# Patient Record
Sex: FEMALE | Race: OTHER | Employment: UNEMPLOYED | URBAN - METROPOLITAN AREA
[De-identification: names, ages, dates, MRNs, and addresses within clinical notes are randomized per-mention and may not be internally consistent; named-entity substitution may affect disease eponyms.]

---

## 2020-03-11 ENCOUNTER — HOSPITAL ENCOUNTER (OUTPATIENT)
Dept: MEDSURG UNIT | Facility: HOSPITAL | Age: 60
End: 2020-03-12
Attending: INTERNAL MEDICINE | Admitting: INTERNAL MEDICINE

## 2020-03-11 LAB
ABS NEUT (OLG): 3.78 X10(3)/MCL (ref 2.1–9.2)
ALBUMIN SERPL-MCNC: 3.6 GM/DL (ref 3.4–5)
ALBUMIN/GLOB SERPL: 0.8 RATIO (ref 1.1–2)
ALP SERPL-CCNC: 101 UNIT/L (ref 45–117)
ALT SERPL-CCNC: 31 UNIT/L (ref 12–78)
APPEARANCE, UA: CLEAR
APTT PPP: 29.7 SECOND(S) (ref 23.3–37)
AST SERPL-CCNC: 22 UNIT/L (ref 15–37)
BACTERIA #/AREA URNS AUTO: ABNORMAL /HPF
BASOPHILS # BLD AUTO: 0 X10(3)/MCL (ref 0–0.2)
BASOPHILS NFR BLD AUTO: 0 %
BILIRUB SERPL-MCNC: 0.3 MG/DL (ref 0.2–1)
BILIRUB UR QL STRIP: NEGATIVE
BILIRUBIN DIRECT+TOT PNL SERPL-MCNC: 0.1 MG/DL (ref 0–0.2)
BILIRUBIN DIRECT+TOT PNL SERPL-MCNC: 0.2 MG/DL
BUN SERPL-MCNC: 16 MG/DL (ref 7–18)
CALCIUM SERPL-MCNC: 9.2 MG/DL (ref 8.5–10.1)
CHLORIDE SERPL-SCNC: 111 MMOL/L (ref 98–107)
CO2 SERPL-SCNC: 26 MMOL/L (ref 21–32)
COLOR UR: ABNORMAL
CREAT SERPL-MCNC: 0.9 MG/DL (ref 0.6–1.3)
D DIMER PPP IA.FEU-MCNC: 1.56 MCG/ML FEU
EOSINOPHIL # BLD AUTO: 0.1 X10(3)/MCL (ref 0–0.9)
EOSINOPHIL NFR BLD AUTO: 1 %
ERYTHROCYTE [DISTWIDTH] IN BLOOD BY AUTOMATED COUNT: 14 % (ref 11.5–14.5)
EST. AVERAGE GLUCOSE BLD GHB EST-MCNC: 117 MG/DL
GLOBULIN SER-MCNC: 4.3 GM/ML (ref 2.3–3.5)
GLUCOSE (UA): NEGATIVE
GLUCOSE SERPL-MCNC: 88 MG/DL (ref 74–106)
HBA1C MFR BLD: 5.7 % (ref 4.2–6.3)
HCT VFR BLD AUTO: 42 % (ref 35–46)
HGB BLD-MCNC: 13.8 GM/DL (ref 12–16)
HGB UR QL STRIP: NEGATIVE
HYALINE CASTS #/AREA URNS LPF: ABNORMAL /LPF
IMM GRANULOCYTES # BLD AUTO: 0.01 10*3/UL
IMM GRANULOCYTES NFR BLD AUTO: 0 %
INR PPP: 0.91 (ref 0.9–1.2)
KETONES UR QL STRIP: NEGATIVE
LEUKOCYTE ESTERASE UR QL STRIP: NEGATIVE
LIPASE SERPL-CCNC: 172 UNIT/L (ref 73–393)
LYMPHOCYTES # BLD AUTO: 1.5 X10(3)/MCL (ref 0.6–4.6)
LYMPHOCYTES NFR BLD AUTO: 25 %
MAGNESIUM SERPL-MCNC: 2.3 MG/DL (ref 1.6–2.6)
MAGNESIUM SERPL-MCNC: 2.4 MG/DL (ref 1.6–2.6)
MCH RBC QN AUTO: 29.4 PG (ref 26–34)
MCHC RBC AUTO-ENTMCNC: 32.9 GM/DL (ref 31–37)
MCV RBC AUTO: 89.6 FL (ref 80–100)
MONOCYTES # BLD AUTO: 0.4 X10(3)/MCL (ref 0.1–1.3)
MONOCYTES NFR BLD AUTO: 8 %
NEUTROPHILS # BLD AUTO: 3.78 X10(3)/MCL (ref 2.1–9.2)
NEUTROPHILS NFR BLD AUTO: 65 %
NITRITE UR QL STRIP: NEGATIVE
PH UR STRIP: 5.5 [PH] (ref 4.5–8)
PLATELET # BLD AUTO: 158 X10(3)/MCL (ref 130–400)
PMV BLD AUTO: 11.3 FL (ref 7.4–10.4)
POTASSIUM SERPL-SCNC: 4.4 MMOL/L (ref 3.5–5.1)
PROT SERPL-MCNC: 7.9 GM/DL (ref 6.4–8.2)
PROT UR QL STRIP: NEGATIVE
PROTHROMBIN TIME: 12.2 SECOND(S) (ref 11.9–14.4)
RBC # BLD AUTO: 4.69 X10(6)/MCL (ref 4–5.2)
RBC #/AREA URNS AUTO: ABNORMAL /HPF
SODIUM SERPL-SCNC: 141 MMOL/L (ref 136–145)
SP GR UR STRIP: 1.04 (ref 1–1.03)
SQUAMOUS #/AREA URNS LPF: ABNORMAL /LPF
T4 FREE SERPL-MCNC: 1.11 NG/DL (ref 0.76–1.46)
TROPONIN I SERPL-MCNC: <0.015 NG/ML (ref 0–0.05)
TROPONIN I SERPL-MCNC: <0.015 NG/ML (ref 0–0.05)
TSH SERPL-ACNC: 0.81 MIU/L (ref 0.36–3.74)
UROBILINOGEN UR STRIP-ACNC: NORMAL
WBC # SPEC AUTO: 5.8 X10(3)/MCL (ref 4.5–11)
WBC #/AREA URNS AUTO: ABNORMAL /HPF

## 2020-03-12 LAB
ABS NEUT (OLG): 3.19 X10(3)/MCL (ref 2.1–9.2)
ALBUMIN SERPL-MCNC: 3.1 GM/DL (ref 3.4–5)
ALBUMIN/GLOB SERPL: 0.8 RATIO (ref 1.1–2)
ALP SERPL-CCNC: 90 UNIT/L (ref 45–117)
ALT SERPL-CCNC: 30 UNIT/L (ref 12–78)
AST SERPL-CCNC: 24 UNIT/L (ref 15–37)
BASOPHILS # BLD AUTO: 0 X10(3)/MCL (ref 0–0.2)
BASOPHILS NFR BLD AUTO: 1 %
BILIRUB SERPL-MCNC: 0.3 MG/DL (ref 0.2–1)
BILIRUBIN DIRECT+TOT PNL SERPL-MCNC: 0.1 MG/DL (ref 0–0.2)
BILIRUBIN DIRECT+TOT PNL SERPL-MCNC: 0.2 MG/DL
BUN SERPL-MCNC: 24 MG/DL (ref 7–18)
CALCIUM SERPL-MCNC: 8.7 MG/DL (ref 8.5–10.1)
CHLORIDE SERPL-SCNC: 114 MMOL/L (ref 98–107)
CHOLEST SERPL-MCNC: 198 MG/DL
CHOLEST/HDLC SERPL: 2.2 {RATIO} (ref 0–4.4)
CO2 SERPL-SCNC: 24 MMOL/L (ref 21–32)
CREAT SERPL-MCNC: 0.9 MG/DL (ref 0.6–1.3)
EOSINOPHIL # BLD AUTO: 0.1 X10(3)/MCL (ref 0–0.9)
EOSINOPHIL NFR BLD AUTO: 2 %
ERYTHROCYTE [DISTWIDTH] IN BLOOD BY AUTOMATED COUNT: 13.8 % (ref 11.5–14.5)
GLOBULIN SER-MCNC: 4 GM/ML (ref 2.3–3.5)
GLUCOSE SERPL-MCNC: 87 MG/DL (ref 74–106)
HCT VFR BLD AUTO: 40.7 % (ref 35–46)
HDLC SERPL-MCNC: 88 MG/DL (ref 40–59)
HGB BLD-MCNC: 13.3 GM/DL (ref 12–16)
IMM GRANULOCYTES # BLD AUTO: 0.01 10*3/UL
IMM GRANULOCYTES NFR BLD AUTO: 0 %
LDLC SERPL CALC-MCNC: 93 MG/DL
LYMPHOCYTES # BLD AUTO: 1.5 X10(3)/MCL (ref 0.6–4.6)
LYMPHOCYTES NFR BLD AUTO: 28 %
MAGNESIUM SERPL-MCNC: 2.2 MG/DL (ref 1.6–2.6)
MCH RBC QN AUTO: 29.2 PG (ref 26–34)
MCHC RBC AUTO-ENTMCNC: 32.7 GM/DL (ref 31–37)
MCV RBC AUTO: 89.5 FL (ref 80–100)
MONOCYTES # BLD AUTO: 0.5 X10(3)/MCL (ref 0.1–1.3)
MONOCYTES NFR BLD AUTO: 9 %
NEUTROPHILS # BLD AUTO: 3.19 X10(3)/MCL (ref 2.1–9.2)
NEUTROPHILS NFR BLD AUTO: 60 %
PHOSPHATE SERPL-MCNC: 3.4 MG/DL (ref 2.5–4.9)
PLATELET # BLD AUTO: 146 X10(3)/MCL (ref 130–400)
PMV BLD AUTO: 11.3 FL (ref 7.4–10.4)
POTASSIUM SERPL-SCNC: 4 MMOL/L (ref 3.5–5.1)
PROT SERPL-MCNC: 7.1 GM/DL (ref 6.4–8.2)
RBC # BLD AUTO: 4.55 X10(6)/MCL (ref 4–5.2)
SODIUM SERPL-SCNC: 144 MMOL/L (ref 136–145)
TRIGL SERPL-MCNC: 86 MG/DL
VLDLC SERPL CALC-MCNC: 17 MG/DL
WBC # SPEC AUTO: 5.3 X10(3)/MCL (ref 4.5–11)

## 2020-04-07 ENCOUNTER — HISTORICAL (OUTPATIENT)
Dept: RADIOLOGY | Facility: HOSPITAL | Age: 60
End: 2020-04-07

## 2020-05-25 ENCOUNTER — HISTORICAL (OUTPATIENT)
Dept: CARDIOLOGY | Facility: HOSPITAL | Age: 60
End: 2020-05-25

## 2020-05-25 LAB
ABS NEUT (OLG): 3.15 X10(3)/MCL (ref 2.1–9.2)
APTT PPP: 30 SECOND(S) (ref 23.3–37)
BASOPHILS # BLD AUTO: 0 X10(3)/MCL (ref 0–0.2)
BASOPHILS NFR BLD AUTO: 0 %
BUN SERPL-MCNC: 15 MG/DL (ref 7–18)
CALCIUM SERPL-MCNC: 9.7 MG/DL (ref 8.5–10.1)
CHLORIDE SERPL-SCNC: 106 MMOL/L (ref 98–107)
CO2 SERPL-SCNC: 30 MMOL/L (ref 21–32)
CREAT SERPL-MCNC: 0.9 MG/DL (ref 0.6–1.3)
CREAT/UREA NIT SERPL: 17
EOSINOPHIL # BLD AUTO: 0.1 X10(3)/MCL (ref 0–0.9)
EOSINOPHIL NFR BLD AUTO: 1 %
ERYTHROCYTE [DISTWIDTH] IN BLOOD BY AUTOMATED COUNT: 14.4 % (ref 11.5–14.5)
GLUCOSE SERPL-MCNC: 83 MG/DL (ref 74–106)
HCT VFR BLD AUTO: 44 % (ref 35–46)
HGB BLD-MCNC: 14.4 GM/DL (ref 12–16)
IMM GRANULOCYTES # BLD AUTO: 0.01 10*3/UL
IMM GRANULOCYTES NFR BLD AUTO: 0 %
INR PPP: 0.98 (ref 0.9–1.2)
LYMPHOCYTES # BLD AUTO: 1.6 X10(3)/MCL (ref 0.6–4.6)
LYMPHOCYTES NFR BLD AUTO: 30 %
MCH RBC QN AUTO: 29.5 PG (ref 26–34)
MCHC RBC AUTO-ENTMCNC: 32.7 GM/DL (ref 31–37)
MCV RBC AUTO: 90.2 FL (ref 80–100)
MONOCYTES # BLD AUTO: 0.6 X10(3)/MCL (ref 0.1–1.3)
MONOCYTES NFR BLD AUTO: 10 %
NEUTROPHILS # BLD AUTO: 3.15 X10(3)/MCL (ref 2.1–9.2)
NEUTROPHILS NFR BLD AUTO: 58 %
PLATELET # BLD AUTO: 135 X10(3)/MCL (ref 130–400)
PMV BLD AUTO: 11.9 FL (ref 7.4–10.4)
POTASSIUM SERPL-SCNC: 4.1 MMOL/L (ref 3.5–5.1)
PROTHROMBIN TIME: 12.6 SECOND(S) (ref 11.9–14.4)
RBC # BLD AUTO: 4.88 X10(6)/MCL (ref 4–5.2)
SODIUM SERPL-SCNC: 140 MMOL/L (ref 136–145)
WBC # SPEC AUTO: 5.4 X10(3)/MCL (ref 4.5–11)

## 2020-05-27 ENCOUNTER — HISTORICAL (OUTPATIENT)
Dept: CARDIOLOGY | Facility: HOSPITAL | Age: 60
End: 2020-05-27

## 2020-06-08 ENCOUNTER — HISTORICAL (OUTPATIENT)
Dept: CARDIOLOGY | Facility: HOSPITAL | Age: 60
End: 2020-06-08

## 2020-11-02 ENCOUNTER — HISTORICAL (OUTPATIENT)
Dept: CARDIOLOGY | Facility: CLINIC | Age: 60
End: 2020-11-02

## 2020-11-02 LAB
ALBUMIN SERPL-MCNC: 4.2 GM/DL (ref 3.5–5)
ALBUMIN/GLOB SERPL: 1 RATIO (ref 1.1–2)
ALP SERPL-CCNC: 100 UNIT/L (ref 40–150)
ALT SERPL-CCNC: 17 UNIT/L (ref 0–55)
AST SERPL-CCNC: 22 UNIT/L (ref 5–34)
BILIRUB SERPL-MCNC: 0.7 MG/DL
BILIRUBIN DIRECT+TOT PNL SERPL-MCNC: 0.3 MG/DL (ref 0–0.5)
BILIRUBIN DIRECT+TOT PNL SERPL-MCNC: 0.4 MG/DL (ref 0–0.8)
BUN SERPL-MCNC: 16 MG/DL (ref 9.8–20.1)
CALCIUM SERPL-MCNC: 9.7 MG/DL (ref 8.4–10.2)
CHLORIDE SERPL-SCNC: 107 MMOL/L (ref 98–107)
CHOLEST SERPL-MCNC: 242 MG/DL
CHOLEST/HDLC SERPL: 3 {RATIO} (ref 0–5)
CO2 SERPL-SCNC: 26 MMOL/L (ref 22–29)
CREAT SERPL-MCNC: 0.86 MG/DL (ref 0.55–1.02)
GLOBULIN SER-MCNC: 4.2 GM/DL (ref 2.4–3.5)
GLUCOSE SERPL-MCNC: 81 MG/DL (ref 74–100)
HDLC SERPL-MCNC: 93 MG/DL (ref 35–60)
LDLC SERPL CALC-MCNC: 135 MG/DL (ref 50–140)
POTASSIUM SERPL-SCNC: 4.2 MMOL/L (ref 3.5–5.1)
PROT SERPL-MCNC: 8.4 GM/DL (ref 6.4–8.3)
SODIUM SERPL-SCNC: 143 MMOL/L (ref 136–145)
TRIGL SERPL-MCNC: 68 MG/DL (ref 37–140)
VLDLC SERPL CALC-MCNC: 14 MG/DL

## 2021-01-29 ENCOUNTER — HISTORICAL (OUTPATIENT)
Dept: CARDIOLOGY | Facility: CLINIC | Age: 61
End: 2021-01-29

## 2021-01-29 LAB
ALBUMIN SERPL-MCNC: 4.4 GM/DL (ref 3.5–5)
ALBUMIN/GLOB SERPL: 1 RATIO (ref 1.1–2)
ALP SERPL-CCNC: 97 UNIT/L (ref 40–150)
ALT SERPL-CCNC: 22 UNIT/L (ref 0–55)
AST SERPL-CCNC: 29 UNIT/L (ref 5–34)
BILIRUB SERPL-MCNC: 0.4 MG/DL
BILIRUBIN DIRECT+TOT PNL SERPL-MCNC: 0.1 MG/DL (ref 0–0.5)
BILIRUBIN DIRECT+TOT PNL SERPL-MCNC: 0.3 MG/DL (ref 0–0.8)
BUN SERPL-MCNC: 17 MG/DL (ref 9.8–20.1)
CALCIUM SERPL-MCNC: 9.8 MG/DL (ref 8.4–10.2)
CHLORIDE SERPL-SCNC: 107 MMOL/L (ref 98–107)
CHOLEST SERPL-MCNC: 245 MG/DL
CHOLEST/HDLC SERPL: 3 {RATIO} (ref 0–5)
CO2 SERPL-SCNC: 24 MMOL/L (ref 22–29)
CREAT SERPL-MCNC: 0.87 MG/DL (ref 0.55–1.02)
GLOBULIN SER-MCNC: 4.3 GM/DL (ref 2.4–3.5)
GLUCOSE SERPL-MCNC: 75 MG/DL (ref 74–100)
HDLC SERPL-MCNC: 91 MG/DL (ref 35–60)
LDLC SERPL CALC-MCNC: 138 MG/DL (ref 50–140)
POTASSIUM SERPL-SCNC: 4.9 MMOL/L (ref 3.5–5.1)
PROT SERPL-MCNC: 8.7 GM/DL (ref 6.4–8.3)
SODIUM SERPL-SCNC: 143 MMOL/L (ref 136–145)
TRIGL SERPL-MCNC: 81 MG/DL (ref 37–140)
VLDLC SERPL CALC-MCNC: 16 MG/DL

## 2021-03-29 ENCOUNTER — HISTORICAL (OUTPATIENT)
Dept: RADIOLOGY | Facility: HOSPITAL | Age: 61
End: 2021-03-29

## 2021-09-09 ENCOUNTER — HISTORICAL (OUTPATIENT)
Dept: CARDIOLOGY | Facility: CLINIC | Age: 61
End: 2021-09-09

## 2021-09-09 LAB
ALBUMIN SERPL-MCNC: 4.2 GM/DL (ref 3.5–5)
ALBUMIN/GLOB SERPL: 1 RATIO (ref 1.1–2)
ALP SERPL-CCNC: 91 UNIT/L (ref 40–150)
ALT SERPL-CCNC: 13 UNIT/L (ref 0–55)
AST SERPL-CCNC: 19 UNIT/L (ref 5–34)
BILIRUB SERPL-MCNC: 0.6 MG/DL
BILIRUBIN DIRECT+TOT PNL SERPL-MCNC: 0.2 MG/DL (ref 0–0.5)
BILIRUBIN DIRECT+TOT PNL SERPL-MCNC: 0.4 MG/DL (ref 0–0.8)
BUN SERPL-MCNC: 14.7 MG/DL (ref 9.8–20.1)
CALCIUM SERPL-MCNC: 10.2 MG/DL (ref 8.4–10.2)
CHLORIDE SERPL-SCNC: 108 MMOL/L (ref 98–107)
CHOLEST SERPL-MCNC: 244 MG/DL
CHOLEST/HDLC SERPL: 3 {RATIO} (ref 0–5)
CO2 SERPL-SCNC: 27 MMOL/L (ref 22–29)
CREAT SERPL-MCNC: 0.83 MG/DL (ref 0.55–1.02)
GLOBULIN SER-MCNC: 4 GM/DL (ref 2.4–3.5)
GLUCOSE SERPL-MCNC: 92 MG/DL (ref 74–100)
HDLC SERPL-MCNC: 82 MG/DL (ref 35–60)
LDLC SERPL CALC-MCNC: 148 MG/DL (ref 50–140)
POTASSIUM SERPL-SCNC: 4.4 MMOL/L (ref 3.5–5.1)
PROT SERPL-MCNC: 8.2 GM/DL (ref 6.4–8.3)
SODIUM SERPL-SCNC: 143 MMOL/L (ref 136–145)
TRIGL SERPL-MCNC: 72 MG/DL (ref 37–140)
VLDLC SERPL CALC-MCNC: 14 MG/DL

## 2022-04-05 LAB
HIGH RISK HPV 16 (PRECISION): NEGATIVE
HIGH RISK HPV 18/45 (PRECISION): NEGATIVE
PAP RECOMMENDATION EXT: NORMAL
PAP SMEAR: NORMAL

## 2022-04-10 ENCOUNTER — HISTORICAL (OUTPATIENT)
Dept: ADMINISTRATIVE | Facility: HOSPITAL | Age: 62
End: 2022-04-10

## 2022-04-14 ENCOUNTER — HISTORICAL (OUTPATIENT)
Dept: RADIOLOGY | Facility: HOSPITAL | Age: 62
End: 2022-04-14

## 2022-04-14 ENCOUNTER — HISTORICAL (OUTPATIENT)
Dept: ADMINISTRATIVE | Facility: HOSPITAL | Age: 62
End: 2022-04-14

## 2022-04-27 VITALS
BODY MASS INDEX: 27.57 KG/M2 | DIASTOLIC BLOOD PRESSURE: 71 MMHG | SYSTOLIC BLOOD PRESSURE: 110 MMHG | OXYGEN SATURATION: 100 % | WEIGHT: 175.69 LBS | HEIGHT: 67 IN

## 2022-04-30 NOTE — ED PROVIDER NOTES
Patient:   Leonora Higginbotham             MRN: [id number]            FIN: [id number]               Age:   60 years     Sex:  Female     :  1960   Associated Diagnoses:   Chest pain; Shortness of breath   Author:   Roshan Sexton MD      Basic Information   Additional information: Chief Complaint from Nursing Triage Note : Chief Complaint   3/11/2020 8:20 CDT       Chief Complaint           PT W CO CP/PAL X 10 DAYS.  EKG OBTAINED.  .      History of Present Illness   Patient presents with 10 days of intermittent chest pain feeling she is having palpitations and shortness of breath.  Her chest discomfort and shortness of breath is worse with exertion.  She states that she did have heart surgery where they did a catheterization as well as having to take fluid off of her heart in the past.  No recent cough congestion fevers nausea vomiting      Review of Systems   Constitutional:  No fever, fatigue or weight loss.    Skin:  No rash.    ENT:  No congestion, ear pain, or sore throat.    Eyes:  No recent vision problems or eye pain.    Endocrine:  No thyroid problems.    Cardiovascular:   chest pain.    Respiratory: Shortness of breath  Gastrointestinal:  No abdominal pain, nausea, vomiting, or diarrhea.    Genitourinary: No dysuria.    Musculoskeletal:  No joint swelling.    Neurologic:  No seizures.  Hematologic:  No unusual bruising or bleeding.    Psychiatric:  No psychiatric problems, hallucinations or depression.  All other systems reviewed and otherwise negative.         Health Status   Allergies:    Allergic Reactions (All)  No Known Allergies,    Allergies (1) Active Reaction  No Known Allergies None Documented  .      Past Medical/ Family/ Social History   Medical history:    Resolved  Migraines ([id number]):  Resolved..   Surgical history:    heart surgery to repair hole in  at 52 Years..   Family history:    Asthma.  Mother  Hypertension.  Father  Mother  .    Social history:    Social & Psychosocial Habits    Alcohol  08/10/2014 Risk Assessment: Denies Alcohol Use    09/10/2015  Use: Never    Employment/School  06/08/2015 Risk Assessment: Not employed or in school    Home/Environment  09/09/2015  Lives with: Spouse    Living situation: Home/Independent    Substance Use  08/10/2014 Risk Assessment: Denies Substance Abuse    09/10/2015  Use: Never    Tobacco  08/10/2014 Risk Assessment: Denies Tobacco Use    09/10/2015  Use: Never smoker    12/30/2018  Use: Never smoker    Patient Wants Consult For Cessation Counseling N/A    03/11/2020  Use: Never (less than 100 in l    Patient Wants Consult For Cessation Counseling N/A    Abuse/Neglect  03/11/2020  SHX Any signs of abuse or neglect No  .   Problem list:    Active Problems (1)  Migraines   .      Physical Examination               Vital Signs   Vital Signs   3/11/2020 8:39 CDT       Peripheral Pulse Rate     67 bpm                             Heart Rate Monitored      66 bpm                             Respiratory Rate          21 br/min                             SpO2                      98 %                             Oxygen Therapy            Room air                             Systolic Blood Pressure   124 mmHg                             Diastolic Blood Pressure  79 mmHg                             Mean Arterial Pressure, Cuff              94 mmHg  (Modified)   3/11/2020 8:20 CDT       Temperature Oral          36.4 DegC                             Temperature Oral (calculated)             97.52 DegF                             Peripheral Pulse Rate     70 bpm                             Respiratory Rate          18 br/min                             SpO2                      99 %                             Oxygen Therapy            Room air                             Systolic Blood Pressure   132 mmHg                             Diastolic Blood Pressure  74 mmHg  .      Vital Signs (last 24 hrs)_____  Last  Charted___________  Temp Oral     36.4 DegC  (MAR 11 08:20)  Heart Rate Peripheral   67 bpm  (MAR 11 08:39)  Resp Rate         21 br/min  (MAR 11 08:39)  SBP      124 mmHg  (MAR 11 08:39)  DBP      79 mmHg  (MAR 11 08:39)  SpO2      98 %  (MAR 11 08:39)  Height      170 cm  (MAR 11 08:20)  .   Measurements   3/11/2020 8:20 CDT       Weight Dosing             78 kg                             Weight Measured and Calculated in Lbs     171.96 lb                             Weight Estimated          78 kg                             Height/Length Dosing      170 cm                             Height/Length Measured    170 cm  .   Basic Oxygen Information   3/11/2020 8:39 CDT       SpO2                      98 %                             Oxygen Therapy            Room air    3/11/2020 8:20 CDT       SpO2                      99 %                             Oxygen Therapy            Room air  .   VITAL SIGNS:  Reviewed.      GENERAL:  In no apparent distress.    HEAD:  No signs of head trauma.  EYES:  Pupils are equal.  Extraocular motions intact.    EARS:  Hearing grossly intact.  MOUTH:  Oropharynx is normal.   NECK:  No adenopathy, no JVD.     CHEST:  Chest with clear breath sounds bilaterally.  No wheezes, rales, or rhonchi.    CARDIAC:  Regular rate and rhythm.  S1 and S2, without murmurs, gallops, or rubs.  ABDOMEN:  Soft, without detectable tenderness.  No sign of distention.  No   rebound or guarding, and no masses palpated.   Bowel Sounds normal.  MUSCULOSKELETAL:  Good range of motion of all major joints. Extremities without clubbing, cyanosis or edema.    NEUROLOGIC EXAM:  Alert and oriented x 3.  No focal sensory or strength deficits.   Speech normal.  Follows commands.  PSYCHIATRIC:  Mood normal.  SKIN: Midline old incision surgical scar on chest         Medical Decision Making   Electrocardiogram:  Time 8:23 AM  Rate of 75, normal sinus rhythm, normal axis and intervals.   Results review:     No  qualifying data available.   HEART Score 4, workup in progress.  Approximately 10 days of intermittent chest discomfort shortness of breath worse with exertion with cardiac history    Negative CT of chest that was obtained due to positive d-dimer.  Due to patient's cardiac history and heart score and intermittent shortness of breath and chest pain with exertion will be admitted for further evaluation.  Internal medicine team to accept      Reexamination/ Reevaluation   Vital signs   Basic Oxygen Information   3/11/2020 8:39 CDT       SpO2                      98 %                             Oxygen Therapy            Room air    3/11/2020 8:20 CDT       SpO2                      99 %                             Oxygen Therapy            Room air        Impression and Plan   Diagnosis   Chest pain (PAR50-OA R07.9)   Shortness of breath (OUV66-FM R06.02)   Plan   Disposition: Admit time  3/11/2020 12:56:00, IM Team.    Counseled: Patient.

## 2022-05-03 NOTE — HISTORICAL [facility] CERNER
This is a historical note converted from Amanda. Formatting and pictures may have been removed.  Please reference [facility] for original formatting and attached multimedia. Admit and Discharge Dates  Admit Date: 03/11/2020  Discharge Date: 03/12/2020  Physicians  Attending Physician - Mo LUEVANO, Kate  Admitting Physician - Mo LUEVANO, Kate  Primary Care Physician - No PCP, No  Discharge Diagnosis  1.?Chest pain?R07.9  ?  2.?Palpitations?[id number]  ?  3.?Shortness of breath?R06.02  ?  Surgical Procedures  No procedures recorded for this visit.  Immunizations  No immunizations recorded for this visit.  Admission Information  Ms Aneudy Barnes?is a 60-year-old  female with past medical history of migraine?presents to the ED with complaints of chest pain, shortness of breath and palpitations.??History was taken with the help of an .? Patient states that her chest pain?started 1 day back,?localized to the central part of her chest, nonradiating,?intermittent, not related to exertion?or rest, 2/10 in intensity.? She states that her main complaint is of palpitations?which she has had for a while now, describes it as her heart beating really fast.??She states that these episodes of tachycardia are accompanied by shortness of breath.? She also states that she has episodes of dizziness?which is sometimes associated with syncope.? She has baseline exertion,?unable to climb a flight of stairs as per the patient?she gets acutely short of breath.? She stated that she had surgery for heart around 7 years back, I had a hole in my heart which was fixed. ?He stated that?following the surgery she had some bleeding to her heart?following which?a tube was placed.? She stated that?all her symptoms started after her heart surgery.? Denies any blurring of vision,?headache,?nausea/vomiting, abdominal pain, bowel/bladder abnormalities.? She states that?her legs occasionally swell  up.In the ED,vitals were stable.? CBC, CMP, troponin were WNL.? D-dimer was elevated at 1.56 following which CT Angio chest was done which did not?reveal any PE but showed mild bibasilar atelectasis or scarring.? Internal medicine was?consulted for atypical chest pain?and palpitations.  Hospital Course  Patient did not have any complaints overnight. ?She stated that her chest pain had improved but still complained of some shortness of breath and palpitations.? Troponin and EKG continued to remain unremarkable.? Post wards follow-up visit in internal medicine clinic?to establish PCP. ?Also set up outpatient?Lexiscan and?Holter monitoring.? We will follow-up with results. ?Advised patient to take 1 baby aspirin?daily?on discharge.? Reconciled all her medications.  Significant Findings  CXR on 3/11  This compared to a previous study from 9/10/2015  ?  There are atelectatic changes in the lung bases. Heart size is within  normal limits for this technique. Patient has had a previous CABG. No  acute infiltrates are seen.  ?  IMPRESSION: Changes most consistent with bibasilar atelectasis.  Time Spent on discharge  30 mins  Objective  Vitals & Measurements  T:?36.5? ?C (Oral)? TMIN:?36.5? ?C (Oral)? TMAX:?36.9? ?C (Oral)? HR:?58(Peripheral)? HR:?57(Monitored)? RR:?18? BP:?111/63? SpO2:?99%?  Physical Exam  General: AAOx3, NAD, alert and cooperative  HEENT: Normocephalic, atraumatic,?PERRLA, EOMI,?no scleral icterus, no?conjunctival pallor  Neck: no LAD, no JVD, supple, no masses or thyromegaly. Trachea midline  CVS: S1/S2 nml, RRR, no murmurs, rubs or gallops  Resp: CTA B/L, no rhonchi, rales, or wheezing  GI: Not distended, not tender, BS+, no guarding  Skin: not jaundiced, warm, no rashes  Extremities: no peripheral edema, peripheral pulses intact  Neuro: CN II-XII grossly intact, strength and sensation symmetric and intact throughout, no focal neurological deficits.  Patient Discharge Condition  Stable to be discharged  home  Discharge Disposition  Discharge to home  Report to Emergency Department if symptoms return or worsen  Follow up in IM post wards clinic on 4/6/2020 with Dr. Zak Shaikh to establish PCP   Discharge Medication Reconciliation  Prescribed  aspirin (aspirin 81 mg oral tablet, CHEWABLE)?81 mg, Oral, Daily  Continue  SUMAtriptan (Imitrex 50 mg oral tablet)?50 mg, Oral, Daily, PRN for migraine headache  baclofen (baclofen 10 mg oral tablet)?10 mg, Oral, TID  diclofenac (diclofenac sodium 75 mg oral delayed release tablet)?75 mg, Oral, BID  fluticasone nasal (fluticasone 50 mcg/inh nasal spray)?2 spray(s), Nasal, BID  Education and Orders Provided  Palpitations, Easy-to-Read(Burmese)  Nonspecific Chest Pain, Easy-to-Read(Burmese)  Syncope, Easy-to-Read(Burmese)  Dizziness, Easy-to-Read(Burmese)  Discharge - 03/12/20 13:22:00 CDT, Home?  Follow up  Report to Emergency Department if symptoms return or worsen  Follow up in IM post wards clinic on 4/6/2020 with Dr. Zak Shaikh to establish PCP      I was present with the Resident during discharge day management.  ?  [ x?] I discussed the case with the Resident and agree with the discharge plan as above.  [ ?] I discussed the case with the Resident and agree with the discharge plan as above?except:  ?  Time spent on discharge [ >30?] minutes  ?

## 2022-05-03 NOTE — HISTORICAL [facility]
This is a historical note converted from Amanda. Formatting and pictures may have been removed.  Please reference Cerc[facility]la for original formatting and attached multimedia. Chief Complaint  PT W CO CP/PAL X 10 DAYS. ?EKG OBTAINED.  History of Present Illness  Ms Aneudy Barnes?is a 60-year-old  female with past medical history of migraine?presents to the ED with complaints of chest pain, shortness of breath and palpitations.??History was taken with the help of an .? Patient states that her chest pain?started 1 day back,?localized to the central part of her chest, nonradiating,?intermittent, not related to exertion?or rest, 2/10 in intensity.? She states that her main complaint is of palpitations?which she has had for a while now, describes it as her heart beating really fast.??She states that these episodes of tachycardia are accompanied by shortness of breath.? She also states that she has episodes of dizziness?which is sometimes associated with syncope.? She has baseline exertion,?unable to climb a flight of stairs as per the patient?she gets acutely short of breath.? She stated that she had surgery for heart around 7 years back, I had a hole in my heart which was fixed. ?He stated that?following the surgery she had some bleeding to her heart?following which?a tube was placed.? She stated that?all her symptoms started after her heart surgery.? Denies any blurring of vision,?headache,?nausea/vomiting, abdominal pain, bowel/bladder abnormalities.? She states that?her legs occasionally swell up.  ?  Past medical history:?Migraine headaches  Past surgical history:?Heart surgery?to repair?hole  Social history: Denies smoking, alcohol or illicit drug use  Family history: Mother had asthma  ?  In the ED,vitals were stable.? CBC, CMP, troponin were WNL.? D-dimer was elevated at 1.56 following which CT Angio chest was done which did not?reveal any PE but showed mild bibasilar atelectasis or scarring.?  Internal medicine was?consulted for atypical chest pain?and palpitations.  Review of Systems  Constitutional: No appetite changes, weight loss, fever, chills, change in activity?level.  HEENT: No earache, No changes vision, no changes in swallowing, no coryza, no scleral or conjunctival changes, no sore throat.  Respiratory: SOB (+), No cough, wheezing, stridor  Cardiovascular: Chest pain and palpitations (+), ?No discoloration,?history of murmur  Gastrointestinal: No vomiting, stool changes, blood in stool, abdominal pain.  Genitourinary: No frequency, discharge, ?burning, blood in urine.  Neurological: History of migraine headaches?(+),no lethargy, seizure activity,weakness  Developmental: No history of gross motor, fine motor, speech or other developmental delays.  Integumentary: No rashes, bruising, lesions.  Psych/Behavior: No history of irritability, lethargy, behavior changes, mood changes.  Endocrine: No unexpected weight changes, growth pattern changes, skin temperature changes, heat or cold intolerance, appetite or thirst changes.  Musculoskeletal: No swelling of joints, diminished use of extremities, gait changes, weakness.  Physical Exam  Vitals & Measurements  T:?36.7? ?C (Oral)? TMIN:?36.4? ?C (Oral)? TMAX:?36.7? ?C (Oral)? HR:?81(Peripheral)? HR:?80(Monitored)? RR:?17? BP:?131/79? SpO2:?100%? WT:?78?kg? BMI:?26.99?  General:?well-developed well-nourished in no acute distress  Eye: PERRLA, EOMI, clear conjunctiva, eyelids normal  Head:?Normocephalic, atraumatic  ENT:?oropharynx without erythema/exudate, oropharynx and nasal mucosal surfaces moist, no maxillary/frontal sinus tenderness to palpation  Neck: full range of motion, no JVD or carotid bruits. Trachea midline. No thyromegaly or lymphadenopathy  Respiratory:?clear to auscultation bilaterally, without rales, wheezes or rhonchi. Normal work of breathing. Chest rise symmetrical on inspiration.  Cardiovascular:?regular rate and rhythm without  murmurs, Normal peripheral perfusion.  Gastrointestinal:?soft, non-tender, non-distended with normal bowel sounds, without masses to palpation  Genitourinary: no CVA tenderness to palpation  Musculoskeletal:?full range of motion of all extremities/spine without limitation or discomfort  Integumentary: no rashes or skin lesions present  Neurologic: cranial nerves intact, no signs of peripheral neurological deficit, motor/sensory function intact  Cognition and Speech:?Oriented, Speech clear and coherent, Functional cognition intact.  Psychiatric:?Cooperative, Appropriate mood & affect  Assessment/Plan  1) Atypical chest pain  2) Palpitations  - Patient came in with complaints of 1 day of chest pain, palpitations and shortness of breath  - History of heart surgery?7 years back to repair a hole in her heart  - Initial troponin and EKG were unremarkable  - Trending troponin and EKG x 1  - Echo ordered, will follow up with results  - Orthostatic vitals ordered?in view of?history of dizziness and syncope  - Follow-up with HbA1c, lipid panel, TSH/T4  - We will set up for outpatient follow-up with internal medicine clinic and cardiology clinic?to work-up?palpitations with Holter monitor or loop recorder  ?  ?  DVT Px: Lovenox 40mg  GI Px:?None  Diet:?Cardiac meal plan  IVF:?None  Abx:?None  Dispo: admitted to?telemetry with monitor,?pending echo read, HbA1c/lipid panel/free T4, will set up?9 clinic to establish PCP and follow-up appointment with cardiology?for Holter/loop recorder   Problem List/Past Medical History  Ongoing  Migraines  Historical  Migraines  Procedure/Surgical History  heart surgery to repair hole (2012)   Medications  Inpatient  acetaminophen, 1000 mg= 2 tab(s), Oral, q6hr, PRN  labetalol, 10 mg= 2 mL, IV Push, q2hr, PRN  Lovenox 40 mg/0.4 mL subcutaneous solution, 40 mg= 0.4 mL, Subcutaneous, Daily  Toradol, 60 mg= 2 mL, IM, Once  Zofran, 4 mg= 2 mL, IV Push, q4hr, PRN  Home  baclofen 10 mg oral tablet,  10 mg= 1 tab(s), Oral, TID,? ?Not taking  diclofenac sodium 75 mg oral delayed release tablet, 75 mg= 1 tab(s), Oral, BID,? ?Not taking  fluticasone 50 mcg/inh nasal spray, 2 spray(s), Nasal, BID,? ?Not taking  Imitrex 50 mg oral tablet, 50 mg= 1 tab(s), Oral, Daily, PRN  Allergies  No Known Allergies  Social History  Abuse/Neglect  No, 03/11/2020  Alcohol - Denies Alcohol Use, 08/10/2014  Never, 09/10/2015  Employment/School - Not employed or in school, 06/08/2015  Home/Environment  Lives with Spouse. Living situation: Home/Independent., 09/09/2015  Substance Use - Denies Substance Abuse, 08/10/2014  Never, 09/10/2015  Tobacco - Denies Tobacco Use, 08/10/2014  Never (less than 100 in lifetime), N/A, 03/11/2020  Never smoker, N/A, 12/30/2018  Never smoker, 09/10/2015  Family History  Asthma.: Mother.  Hypertension.: Mother and Father.  Lab Results  Labs Last 24 Hours?  ?Chemistry? Hematology/Coagulation?   Sodium Lvl: 141 mmol/L (03/11/20 09:39:00) PT: 12.2 second(s) (03/11/20 09:39:00)   Potassium Lvl: 4.4 mmol/L (03/11/20 09:39:00) INR: 0.91 (03/11/20 09:39:00)   Chloride:?111 mmol/L?High (03/11/20 09:39:00) PTT: 29.7 second(s) (03/11/20 09:39:00)   CO2: 26 mmol/L (03/11/20 09:39:00) D-Dimer:?1.56 mcg/ml FEU?High (03/11/20 09:39:00)   Calcium Lvl: 9.2 mg/dL (03/11/20 09:39:00) WBC: 5.8 x10(3)/mcL (03/11/20 09:39:00)   Magnesium Lvl: 2.3 mg/dL (03/11/20 09:39:00) RBC: 4.69 x10(6)/mcL (03/11/20 09:39:00)   Glucose Lvl: 88 mg/dL (03/11/20 09:39:00) Hgb: 13.8 gm/dL (03/11/20 09:39:00)   BUN: 16 mg/dL (03/11/20 09:39:00) Hct: 42 % (03/11/20 09:39:00)   Creatinine: 0.9 mg/dL (03/11/20 09:39:00) Platelet: 158 x10(3)/mcL (03/11/20 09:39:00)   eGFR-AA:?83 mL/min?Low (03/11/20 09:39:00) MCV: 89.6 fL (03/11/20 09:39:00)   eGFR-SCOTT:?68 mL/min?Low (03/11/20 09:39:00) MCH: 29.4 pg (03/11/20 09:39:00)   Bili Total: 0.3 mg/dL (03/11/20 09:39:00) MCHC: 32.9 gm/dL (03/11/20 09:39:00)   Bili Direct: 0.1 mg/dL (03/11/20 09:39:00)  RDW: 14 % (03/11/20 09:39:00)   Bili Indirect: 0.2 mg/dL (03/11/20 09:39:00) MPV:?11.3 fL?High (03/11/20 09:39:00)   AST: 22 unit/L (03/11/20 09:39:00) Abs Neut: 3.78 x10(3)/mcL (03/11/20 09:39:00)   ALT: 31 unit/L (03/11/20 09:39:00) Neutro Auto: 65 % (03/11/20 09:39:00)   Alk Phos: 101 unit/L (03/11/20 09:39:00) Lymph Auto: 25 % (03/11/20 09:39:00)   Total Protein: 7.9 gm/dL (03/11/20 09:39:00) Mono Auto: 8 % (03/11/20 09:39:00)   Albumin Lvl: 3.6 gm/dL (03/11/20 09:39:00) Eos Auto: 1 % (03/11/20 09:39:00)   Globulin:?4.3 gm/mL?High (03/11/20 09:39:00) Abs Eos: 0.1 x10(3)/mcL (03/11/20 09:39:00)   A/G Ratio:?0.8 ratio?Low (03/11/20 09:39:00) Basophil Auto: 0 % (03/11/20 09:39:00)   NT pro BNP.:?285 pg/mL?High (03/11/20 09:39:00) Abs Neutro: 3.78 x10(3)/mcL (03/11/20 09:39:00)   Lipase Lvl: 172 unit/L (03/11/20 09:39:00) Abs Lymph: 1.5 x10(3)/mcL (03/11/20 09:39:00)   Troponin-I: <0.015 (03/11/20 09:39:00) Abs Mono: 0.4 x10(3)/mcL (03/11/20 09:39:00)   TSH: 0.808 mIU/L (03/11/20 09:39:00) Abs Baso: 0 x10(3)/mcL (03/11/20 09:39:00)    IG%: 0 % (03/11/20 09:39:00)    IG#: 0.01 (03/11/20 09:39:00)   Diagnostic Results  Accession:?[id number]  Order:?CT Angio Chest PE W Contrast  Report Dt/Tm:?03/11/2020 11:40  Report:?  ?  History  Chest pain.  ?  Reference  None available.  ?  Technique  Helical acquisition through the chest with IV contrast targeting the  pulmonary arteries. Multiplanar and 3D MIP reconstructed images were  provided for review.  mGycm. Automatic exposure control,  adjustment of mA/kV or iterative reconstruction technique was used to  reduce radiation.  ?  Findings  There is good opacification of the pulmonary arterial tree. There is  no evidence of pulmonary thromboembolism. No aortic dissection.?  ?  There is no mediastinal, hilar or axillary lymphadenopathy by size  criteria. Heart is mildly enlarged. No pericardial effusion.  ?  No pleural effusion. There is bibasilar atelectasis or  scarring. No  consolidation suspicious for pneumonia.  ?  No significant abnormality of the imaged upper abdomen. No acute  osseous findings. Median sternotomy noted.  ?  Impression  Negative for pulmonary thromboembolic disease. Mild bibasilar  atelectasis or scarring.  ?  ?  ?  ?  ?  Accession:?[id number]  Order:?XR Chest 1 View  Report Dt/Tm:?03/11/2020 09:48  Report:?  ?chest one view portable  ?  Clinical history is chest pain  ?  This compared to a previous study from 9/10/2015  ?  There are atelectatic changes in the lung bases. Heart size is within  normal limits for this technique. Patient has had a previous CABG. No  acute infiltrates are seen.  ?  IMPRESSION: Changes most consistent with bibasilar atelectasis.  ?      PGY III?addendum.  Patient seen and examined with intern; all lab findings, images and chart documentation were reviewed.?  ?  ?  60 y.o  Serbian speaking F who is admitted for atypical chest pain of 1 days duration that was brought upon by exertion and was self limiting, non radiating and preceded by a 10 day history of anxiety related palpitations. She does not have a PCP. Troponin negative x2. EKG with sinus tachycardia. does have a history of a cardiac procedure/surgery done 7 years ago [place] (records unavailable) s currently not followed by cardiology?provider. ?She also does not have a PCP.??Heart score 1 (age). ?We have admitted her?to?set her up with a PCP and to observe her overnight for any cardiac related events-we can likely set her up with a event monitor for the next 30 days to?determine if she is having any Cerner tachyarrhythmias that might be contributing to her palpitations which are likely?related?to underlying?anxiety.? She does not have HTN, she does not smoke, she does not have diabetes, she does not have hx of MI or strokes. ?She denies any signs or symptoms of?heart failure. ?CT PE protocol was done in the ED?and is negative for a PE. ?TSH, lipid  panel, A1c have been ordered for review. ?We will also order an echocardiogram to evaluate for any structural heart disease.   Seen and examined the patient during morning rounds along with resident, reviewed chart, discussed assessment and plan, appropriate care provided. Agree with the note above.?Chest pain resolved, Troponin normal. Because of gender and presentation?with chest pain of no other etiology, plan to get a stress test as outpatient. Also Monitor for arrhythmias at home. CT angio negative for PE

## 2022-05-26 ENCOUNTER — HOSPITAL ENCOUNTER (EMERGENCY)
Facility: HOSPITAL | Age: 62
Discharge: HOME | End: 2022-05-26
Attending: INTERNAL MEDICINE

## 2022-05-26 VITALS
OXYGEN SATURATION: 100 % | SYSTOLIC BLOOD PRESSURE: 105 MMHG | DIASTOLIC BLOOD PRESSURE: 73 MMHG | WEIGHT: 167.56 LBS | RESPIRATION RATE: 20 BRPM | HEIGHT: 66 IN | BODY MASS INDEX: 26.93 KG/M2 | TEMPERATURE: 98 F | HEART RATE: 56 BPM

## 2022-05-26 DIAGNOSIS — R10.9 ABDOMINAL PAIN: ICD-10-CM

## 2022-05-26 DIAGNOSIS — R10.32 LEFT LOWER QUADRANT ABDOMINAL PAIN: Primary | ICD-10-CM

## 2022-05-26 DIAGNOSIS — N83.209 CYST OF OVARY, UNSPECIFIED LATERALITY: ICD-10-CM

## 2022-05-26 LAB
ALBUMIN SERPL-MCNC: 4.1 GM/DL (ref 3.4–4.8)
ALBUMIN/GLOB SERPL: 1 RATIO (ref 1.1–2)
ALP SERPL-CCNC: 92 UNIT/L (ref 40–150)
ALT SERPL-CCNC: 17 UNIT/L (ref 0–55)
AMYLASE SERPL-CCNC: 65 UNIT/L (ref 25–125)
APPEARANCE UR: CLEAR
AST SERPL-CCNC: 21 UNIT/L (ref 5–34)
BACTERIA #/AREA URNS AUTO: NORMAL /HPF
BASOPHILS # BLD AUTO: 0.04 X10(3)/MCL (ref 0–0.2)
BASOPHILS NFR BLD AUTO: 0.5 %
BILIRUB UR QL STRIP.AUTO: NEGATIVE MG/DL
BILIRUBIN DIRECT+TOT PNL SERPL-MCNC: 0.6 MG/DL
BUN SERPL-MCNC: 12.4 MG/DL (ref 9.8–20.1)
CALCIUM SERPL-MCNC: 10.3 MG/DL (ref 8.4–10.2)
CHLORIDE SERPL-SCNC: 105 MMOL/L (ref 98–107)
CO2 SERPL-SCNC: 24 MMOL/L (ref 23–31)
COLOR UR AUTO: COLORLESS
CREAT SERPL-MCNC: 0.82 MG/DL (ref 0.55–1.02)
EOSINOPHIL # BLD AUTO: 0.08 X10(3)/MCL (ref 0–0.9)
EOSINOPHIL NFR BLD AUTO: 1 %
ERYTHROCYTE [DISTWIDTH] IN BLOOD BY AUTOMATED COUNT: 14 % (ref 11.5–17)
GLOBULIN SER-MCNC: 4.3 GM/DL (ref 2.4–3.5)
GLUCOSE SERPL-MCNC: 71 MG/DL (ref 82–115)
GLUCOSE UR QL STRIP.AUTO: NORMAL MG/DL
HCT VFR BLD AUTO: 40.4 % (ref 37–47)
HGB BLD-MCNC: 12.6 GM/DL (ref 12–16)
HYALINE CASTS #/AREA URNS LPF: NORMAL /LPF
IMM GRANULOCYTES # BLD AUTO: 0.03 X10(3)/MCL (ref 0–0.02)
IMM GRANULOCYTES NFR BLD AUTO: 0.4 % (ref 0–0.43)
KETONES UR QL STRIP.AUTO: NEGATIVE MG/DL
LEUKOCYTE ESTERASE UR QL STRIP.AUTO: NEGATIVE UNIT/L
LIPASE SERPL-CCNC: 29 U/L
LYMPHOCYTES # BLD AUTO: 1.88 X10(3)/MCL (ref 0.6–4.6)
LYMPHOCYTES NFR BLD AUTO: 23.8 %
MCH RBC QN AUTO: 28.8 PG (ref 27–31)
MCHC RBC AUTO-ENTMCNC: 31.2 MG/DL (ref 33–36)
MCV RBC AUTO: 92.4 FL (ref 80–94)
MONOCYTES # BLD AUTO: 0.46 X10(3)/MCL (ref 0.1–1.3)
MONOCYTES NFR BLD AUTO: 5.8 %
NEUTROPHILS # BLD AUTO: 5.4 X10(3)/MCL (ref 2.1–9.2)
NEUTROPHILS NFR BLD AUTO: 68.5 %
NITRITE UR QL STRIP.AUTO: NEGATIVE
NRBC BLD AUTO-RTO: 0 %
PH UR STRIP.AUTO: 7.5 [PH]
PLATELET # BLD AUTO: 85 X10(3)/MCL (ref 130–400)
PMV BLD AUTO: 12.8 FL (ref 9.4–12.4)
POTASSIUM SERPL-SCNC: 4.2 MMOL/L (ref 3.5–5.1)
PROT SERPL-MCNC: 8.4 GM/DL (ref 5.8–7.6)
PROT UR QL STRIP.AUTO: NEGATIVE MG/DL
RBC # BLD AUTO: 4.37 X10(6)/MCL (ref 4.2–5.4)
RBC #/AREA URNS AUTO: NORMAL /HPF
RBC UR QL AUTO: NEGATIVE UNIT/L
SODIUM SERPL-SCNC: 141 MMOL/L (ref 136–145)
SP GR UR STRIP.AUTO: 1.01
SQUAMOUS #/AREA URNS LPF: NORMAL /HPF
TROPONIN I SERPL-MCNC: <0.01 NG/ML (ref 0–0.04)
UROBILINOGEN UR STRIP-ACNC: NORMAL MG/DL
WBC # SPEC AUTO: 7.9 X10(3)/MCL (ref 4.5–11.5)
WBC #/AREA URNS AUTO: NORMAL /HPF

## 2022-05-26 PROCEDURE — 96360 HYDRATION IV INFUSION INIT: CPT

## 2022-05-26 PROCEDURE — 96372 THER/PROPH/DIAG INJ SC/IM: CPT | Mod: 59 | Performed by: PHYSICIAN ASSISTANT

## 2022-05-26 PROCEDURE — 81001 URINALYSIS AUTO W/SCOPE: CPT | Performed by: PHYSICIAN ASSISTANT

## 2022-05-26 PROCEDURE — 99285 EMERGENCY DEPT VISIT HI MDM: CPT | Mod: 25

## 2022-05-26 PROCEDURE — 84484 ASSAY OF TROPONIN QUANT: CPT | Performed by: PHYSICIAN ASSISTANT

## 2022-05-26 PROCEDURE — 85025 COMPLETE CBC W/AUTO DIFF WBC: CPT | Performed by: PHYSICIAN ASSISTANT

## 2022-05-26 PROCEDURE — 93005 ELECTROCARDIOGRAM TRACING: CPT

## 2022-05-26 PROCEDURE — 36415 COLL VENOUS BLD VENIPUNCTURE: CPT | Performed by: PHYSICIAN ASSISTANT

## 2022-05-26 PROCEDURE — 82150 ASSAY OF AMYLASE: CPT | Performed by: PHYSICIAN ASSISTANT

## 2022-05-26 PROCEDURE — 83690 ASSAY OF LIPASE: CPT | Performed by: PHYSICIAN ASSISTANT

## 2022-05-26 PROCEDURE — 25000003 PHARM REV CODE 250: Performed by: PHYSICIAN ASSISTANT

## 2022-05-26 PROCEDURE — 96361 HYDRATE IV INFUSION ADD-ON: CPT

## 2022-05-26 PROCEDURE — 63600175 PHARM REV CODE 636 W HCPCS: Performed by: PHYSICIAN ASSISTANT

## 2022-05-26 PROCEDURE — 80053 COMPREHEN METABOLIC PANEL: CPT | Performed by: PHYSICIAN ASSISTANT

## 2022-05-26 RX ORDER — PRAVASTATIN SODIUM 10 MG/1
10 TABLET ORAL DAILY
COMMUNITY
Start: 2021-12-02 | End: 2022-08-12

## 2022-05-26 RX ORDER — NITROGLYCERIN 0.4 MG/1
0.4 TABLET SUBLINGUAL EVERY 5 MIN PRN
COMMUNITY
Start: 2021-12-02

## 2022-05-26 RX ORDER — ATENOLOL 25 MG/1
25 TABLET ORAL DAILY
COMMUNITY
Start: 2021-12-02 | End: 2023-08-09 | Stop reason: SDUPTHER

## 2022-05-26 RX ORDER — ATORVASTATIN CALCIUM 20 MG/1
20 TABLET, FILM COATED ORAL DAILY
COMMUNITY
Start: 2022-02-17 | End: 2022-08-12 | Stop reason: SDUPTHER

## 2022-05-26 RX ORDER — PANTOPRAZOLE SODIUM 40 MG/1
40 TABLET, DELAYED RELEASE ORAL DAILY
COMMUNITY
Start: 2021-12-02 | End: 2023-02-07 | Stop reason: SDUPTHER

## 2022-05-26 RX ORDER — DICYCLOMINE HYDROCHLORIDE 10 MG/ML
20 INJECTION INTRAMUSCULAR
Status: COMPLETED | OUTPATIENT
Start: 2022-05-26 | End: 2022-05-26

## 2022-05-26 RX ORDER — CYCLOBENZAPRINE HCL 10 MG
10 TABLET ORAL NIGHTLY
COMMUNITY
Start: 2022-03-30 | End: 2023-02-07

## 2022-05-26 RX ORDER — DOCUSATE SODIUM 100 MG/1
100 CAPSULE, LIQUID FILLED ORAL 2 TIMES DAILY PRN
Qty: 10 CAPSULE | Refills: 0 | Status: SHIPPED | OUTPATIENT
Start: 2022-05-26 | End: 2022-05-31

## 2022-05-26 RX ADMIN — SODIUM CHLORIDE 1000 ML: 9 INJECTION, SOLUTION INTRAVENOUS at 11:05

## 2022-05-26 RX ADMIN — DICYCLOMINE HYDROCHLORIDE 20 MG: 10 INJECTION INTRAMUSCULAR at 11:05

## 2022-05-26 NOTE — ED PROVIDER NOTES
Encounter Date: 5/26/2022       History     Chief Complaint   Patient presents with    Pelvic Discomfort     Pt c/o pelvic pain and intermittent constipation x3 days. Denies vaginal bleeding, dysuria, discharge, n/v/d.     Patient reports LLQ abdominal pain for the past x3 days; pt denies fever and vomiting; pt reports some straining with bowel movements but reports history of chronic constipation    The history is provided by the patient.   Abdominal Pain  The current episode started two days ago. The onset of the illness was abrupt. The problem has not changed since onset.The abdominal pain is located in the LLQ. The abdominal pain does not radiate. The severity of the abdominal pain is 5/10. The abdominal pain is relieved by nothing. The other symptoms of the illness do not include fever, fatigue, jaundice, shortness of breath, nausea, vomiting, dysuria, vaginal discharge or vaginal bleeding.   The patient states that she believes she is currently not pregnant. The patient has not had a change in bowel habit. Symptoms associated with the illness do not include chills, diaphoresis, constipation, urgency, hematuria, frequency or back pain. Significant associated medical issues do not include diabetes, substance abuse or HIV.     Review of patient's allergies indicates:   Allergen Reactions    Aspirin Other (See Comments)     Other reaction(s): causes stomach pain  Pt reports ggi upset       History reviewed. No pertinent past medical history.  Past Surgical History:   Procedure Laterality Date    CARDIAC SURGERY       History reviewed. No pertinent family history.  Social History     Tobacco Use    Smoking status: Never Smoker   Substance Use Topics    Alcohol use: Never    Drug use: Never     Review of Systems   Constitutional: Negative for chills, diaphoresis, fatigue and fever.   HENT: Negative for sore throat.    Eyes: Negative.    Respiratory: Negative for shortness of breath.    Cardiovascular: Negative  for chest pain.   Gastrointestinal: Positive for abdominal pain. Negative for constipation, jaundice, nausea and vomiting.   Genitourinary: Negative for dysuria, frequency, hematuria, urgency, vaginal bleeding and vaginal discharge.   Musculoskeletal: Negative for back pain.   Skin: Negative for rash.   Neurological: Negative for weakness.   Hematological: Does not bruise/bleed easily.   Psychiatric/Behavioral: Negative.        Physical Exam     Initial Vitals [05/26/22 1052]   BP Pulse Resp Temp SpO2   135/82 64 20 98.2 °F (36.8 °C) 100 %      MAP       --         Physical Exam    Nursing note and vitals reviewed.  Constitutional: She appears well-developed and well-nourished.   HENT:   Head: Normocephalic and atraumatic.   Eyes: EOM are normal.   Neck: Neck supple.   Normal range of motion.  Cardiovascular: Normal rate and regular rhythm.   Pulmonary/Chest: Breath sounds normal.   Abdominal: Abdomen is soft and flat. Bowel sounds are normal. There is abdominal tenderness in the left lower quadrant. No hernia. There is no tenderness at McBurney's point and negative Alvarez's sign. negative psoas sign and negative Rovsing's sign  Musculoskeletal:         General: Normal range of motion.      Cervical back: Normal range of motion and neck supple.     Neurological: She is alert and oriented to person, place, and time.   Skin: Skin is warm and dry.   Psychiatric: She has a normal mood and affect. Her behavior is normal. Judgment and thought content normal.         ED Course   Procedures  Labs Reviewed   COMPREHENSIVE METABOLIC PANEL - Abnormal; Notable for the following components:       Result Value    Glucose Level 71 (*)     Calcium Level Total 10.3 (*)     Protein Total 8.4 (*)     Globulin 4.3 (*)     Albumin/Globulin Ratio 1.0 (*)     All other components within normal limits   CBC WITH DIFFERENTIAL - Abnormal; Notable for the following components:    MCHC 31.2 (*)     Platelet 85 (*)     MPV 12.8 (*)     IG# 0.03  (*)     All other components within normal limits   AMYLASE - Normal   LIPASE - Normal   TROPONIN I - Normal   CBC W/ AUTO DIFFERENTIAL    Narrative:     The following orders were created for panel order CBC auto differential.  Procedure                               Abnormality         Status                     ---------                               -----------         ------                     CBC with Differential[[id number]]        Abnormal            Final result                 Please view results for these tests on the individual orders.   URINALYSIS        ECG Results          EKG 12-lead (In process)  Result time 05/26/22 11:29:11    In process by Interface, Lab In [facility] (05/26/22 11:29:11)                 Narrative:    Test Reason : R10.9,    Vent. Rate : 075 BPM     Atrial Rate : 061 BPM     P-R Int : 144 ms          QRS Dur : 082 ms      QT Int : 442 ms       P-R-T Axes : 081 024 072 degrees     QTc Int : 493 ms    Sinus rhythm with occasional Premature ventricular complexes  Prolonged QT  Abnormal ECG  No previous ECGs available    Referred By: PHYSICIAN ER           Confirmed By:                             Imaging Results          CT Abdomen Pelvis  Without Contrast (Final result)  Result time 05/26/22 14:11:00    Final result by Matilda Howard MD (05/26/22 14:11:00)                 Impression:      1. No acute abnormality abdomen or pelvis.  2. Moderate colonic stool volume without obstruction.  3. Likely 2.2 cm left ovarian cyst.      Electronically signed by: Matilda Howard  Date:    05/26/2022  Time:    14:11             Narrative:    EXAMINATION:  CT ABDOMEN PELVIS WITHOUT CONTRAST    CLINICAL HISTORY:  Abdominal pain, acute, nonlocalized;llq abd pain;    TECHNIQUE:  CT imaging was performed of the abdomen and pelvis without intravenous contrast. Dose length product is 365 mGycm. Automatic exposure control, adjustment of mA/kV or iterative reconstruction technique was used to limit  radiation dose.    COMPARISON:  None    FINDINGS:  Assessment of the visceral organs and vasculature is limited by the lack of IV contrast.    Liver/biliary: No concerning hepatic findings. No radiodense gallstone or biliary dilatation appreciated.    Pancreas: Normal.    Spleen: Normal.    Adrenals: Normal.    Kidneys, ureters and bladder: There is no obstructing urinary calculus.  There is no hydronephrosis.  There is mild diffuse urinary bladder wall thickening    Reproductive organs: There is a 2.2 cm likely left ovarian cyst.    Stomach/bowel: There is a moderate colonic stool volume without obstruction.  Normal appendix. No discernible bowel inflammation.    Lymph nodes: No pathologically enlarged lymph node identified with noncontrast technique.    Peritoneum: No ascites or free air.    Vessels: Normal abdominal aortic caliber.    Abdominal wall: Normal.    Lung bases: Left basilar subsegmental atelectasis.    Bones: No acute osseous findings.                                 Medications   sodium chloride 0.9% bolus 1,000 mL (0 mLs Intravenous Stopped 5/26/22 1318)   dicyclomine injection 20 mg (20 mg Intramuscular Given 5/26/22 1145)                          Clinical Impression:   Final diagnoses:  [R10.9] Abdominal pain  [R10.32] Left lower quadrant abdominal pain (Primary)  [N83.209] Cyst of ovary, unspecified laterality          ED Disposition Condition    Discharge Stable        ED Prescriptions     Medication Sig Dispense Start Date End Date Auth. Provider    docusate sodium (COLACE) 100 MG capsule Take 1 capsule (100 mg total) by mouth 2 (two) times daily as needed for Constipation. 10 capsule 5/26/2022 5/31/2022 GIANNI Gaines        Follow-up Information     Follow up With Specialties Details Why Contact Info    discharge followup    If your symptoms become WORSE or you DO NOT IMPROVE and you are unable to reach your health care provider, you should RETURN to the emergency department    discharge  info    Discussed all pertinent ED information, results, diagnosis and treatment plan; All questions and concerns were addressed at this time. Patient voices understanding of information and instructions. Patient is comfortable with plan and discharge           GIANNI Gaines  05/26/22 0834

## 2022-07-25 DIAGNOSIS — E78.2 MIXED HYPERLIPIDEMIA: Primary | ICD-10-CM

## 2022-07-26 ENCOUNTER — OFFICE VISIT (OUTPATIENT)
Dept: CARDIOLOGY | Facility: CLINIC | Age: 62
End: 2022-07-26
Payer: MEDICAID

## 2022-07-26 VITALS
TEMPERATURE: 99 F | HEART RATE: 62 BPM | RESPIRATION RATE: 20 BRPM | DIASTOLIC BLOOD PRESSURE: 61 MMHG | WEIGHT: 164 LBS | HEIGHT: 67 IN | SYSTOLIC BLOOD PRESSURE: 98 MMHG | OXYGEN SATURATION: 100 % | BODY MASS INDEX: 25.74 KG/M2

## 2022-07-26 DIAGNOSIS — I47.10 SVT (SUPRAVENTRICULAR TACHYCARDIA): Primary | ICD-10-CM

## 2022-07-26 DIAGNOSIS — R53.83 FATIGUE, UNSPECIFIED TYPE: ICD-10-CM

## 2022-07-26 PROCEDURE — 99214 OFFICE O/P EST MOD 30 MIN: CPT | Mod: PBBFAC | Performed by: INTERNAL MEDICINE

## 2022-07-26 RX ORDER — SUMATRIPTAN SUCCINATE 100 MG/1
1 TABLET ORAL
COMMUNITY
End: 2023-02-07 | Stop reason: SDUPTHER

## 2022-07-26 NOTE — PROGRESS NOTES
" [facility]  Cariology Clinic - Follow Up     Date of Visit: 7/26/2022  Reason for Visit/Chief Complaint:   Chief Complaint     3 month f/u denies chest pain has MEDRANO           History of Present Illness:      Leonora Barnes is a 62 y.o. female with a MH of HLD, SVT on Holter 2020 and unspecified cardiac surgery in 2012 to repair "hole" presenting today for follow up. Normal LVEF in 2020 and LHC done as a result of abnormal nuclear scan w/o CAD.    She reports palpitations once a month and is taking Atenolol daily. She has LE edema when she stands for sometime and shortness of breath sometimes at rest and exertion yuan with feeling fatigued on waking up and not feeling well rested. She reports shortness of breath as mainly frequent yawning. Her SO other does tell her she snores.     Past Medical History:   History reviewed. No pertinent past medical history.    Surgical History:     Past Surgical History:   Procedure Laterality Date    CARDIAC SURGERY         Family History:   History reviewed. No pertinent family history.    Social History:     Social History     Tobacco Use    Smoking status: Never Smoker    Smokeless tobacco: Never Used   Substance Use Topics    Alcohol use: Never    Drug use: Never       Allergies:     Review of patient's allergies indicates:   Allergen Reactions    Aspirin Other (See Comments)     Other reaction(s): causes stomach pain  Pt reports ggi upset         Medications:     Current Outpatient Medications   Medication Sig Dispense Refill    atenoloL (TENORMIN) 25 MG tablet Take 25 mg by mouth once daily at 6am.      atorvastatin (LIPITOR) 20 MG tablet Take 20 mg by mouth once daily.      linaCLOtide (LINZESS) 145 mcg Cap capsule Take 145 mcg by mouth once daily at 6am.      nitroGLYCERIN (NITROSTAT) 0.4 MG SL tablet Place 0.4 mg under the tongue every 5 (five) minutes as needed.      sumatriptan " "(IMITREX) 100 MG tablet Take 1 tablet by mouth as needed.      cyclobenzaprine (FLEXERIL) 10 MG tablet Take 10 mg by mouth every evening.      pantoprazole (PROTONIX) 40 MG tablet Take 40 mg by mouth once daily at 6am.      pravastatin (PRAVACHOL) 10 MG tablet Take 10 mg by mouth once daily at 6am.       No current facility-administered medications for this visit.       I have reviewed and updated the patient's medications, allergies, past medical history, surgical history, social history and family history as needed.    Review of Systems:   Review of Systems   Constitutional: Negative.    HENT: Negative.    Eyes: Negative.    Respiratory: Positive for shortness of breath.    Cardiovascular: Positive for leg swelling.   Gastrointestinal: Negative.    Genitourinary: Negative.    Musculoskeletal: Negative.    Skin: Negative.    Neurological: Negative.    Endo/Heme/Allergies: Negative.    Psychiatric/Behavioral: Negative.         Objective:     Wt Readings from Last 3 Encounters:   07/26/22 74.4 kg (164 lb 0.4 oz)   05/26/22 76 kg (167 lb 8.8 oz)   12/02/21 79.7 kg (175 lb 11.3 oz)     Temp Readings from Last 3 Encounters:   07/26/22 98.8 °F (37.1 °C) (Oral)   05/26/22 98.2 °F (36.8 °C) (Oral)     BP Readings from Last 3 Encounters:   07/26/22 98/61   05/26/22 105/73   12/02/21 110/71     Pulse Readings from Last 3 Encounters:   07/26/22 62   05/26/22 (!) 56       Vitals:    07/26/22 1553   BP: 98/61   BP Location: Right arm   BP Method: Medium (Automatic)   Pulse: 62   Resp: 20   Temp: 98.8 °F (37.1 °C)   TempSrc: Oral   SpO2: 100%   Weight: 74.4 kg (164 lb 0.4 oz)   Height: 5' 6.54" (1.69 m)     Body mass index is 26.05 kg/m².    Physical Exam  Constitutional:       Appearance: Normal appearance.   HENT:      Head: Normocephalic and atraumatic.      Nose: Nose normal.      Mouth/Throat:      Pharynx: Oropharynx is clear.   Eyes:      Conjunctiva/sclera: Conjunctivae normal.   Cardiovascular:      Rate and Rhythm: " Normal rate and regular rhythm.      Pulses: Normal pulses.      Heart sounds: Normal heart sounds.   Pulmonary:      Effort: Pulmonary effort is normal.      Breath sounds: Normal breath sounds.   Abdominal:      General: Abdomen is flat.      Palpations: Abdomen is soft.   Musculoskeletal:      Right lower leg: No edema.      Left lower leg: No edema.   Skin:     General: Skin is warm and dry.   Neurological:      General: No focal deficit present.      Mental Status: She is alert.   Psychiatric:         Mood and Affect: Mood normal.          Labs:   I have reviewed the following labs below:      CBC:  Lab Results   Component Value Date    WBC 7.9 05/26/2022    HGB 12.6 05/26/2022    HCT 40.4 05/26/2022    PLT 85 (L) 05/26/2022    MCV 92.4 05/26/2022    RDW 14.0 05/26/2022     BMP:  Lab Results   Component Value Date     05/26/2022    K 4.2 05/26/2022    CO2 24 05/26/2022    BUN 12.4 05/26/2022    CALCIUM 10.3 (H) 05/26/2022    MG 2.2 03/12/2020    PHOS 3.4 03/12/2020     LFTs:  Lab Results   Component Value Date    ALBUMIN 4.1 05/26/2022    BILITOT 0.6 05/26/2022    AST 21 05/26/2022    ALKPHOS 92 05/26/2022    ALT 17 05/26/2022     FLP:  Cholesterol Total   Date Value Ref Range Status   09/09/2021 244 (H) <<=200 mg/dL Final     HDL Cholesterol   Date Value Ref Range Status   09/09/2021 82 (H) 35 - 60 mg/dL Final     LDL Cholesterol   Date Value Ref Range Status   09/09/2021 148.00 (H) 50.00 - 140.00 mg/dL Final     Triglyceride   Date Value Ref Range Status   09/09/2021 72 37 - 140 mg/dL Final     DM:  Lab Results   Component Value Date    HGBA1C 5.7 03/11/2020    CREATININE 0.82 05/26/2022     Thyroid:  Lab Results   Component Value Date    TSH 0.808 03/11/2020     Anemia:No results found for: IRON, TIBC, FERRITIN, [id number], FOLATE  Coags:  Lab Results   Component Value Date    INR 0.98 05/25/2020    PROTIME 12.6 05/25/2020      Cardiac:  Lab Results   Component Value Date    TROPONINI <0.010 05/26/2022     TROPONINI <0.015 03/31/2020    TROPONINI <0.015 03/11/2020    TROPONINI <0.015 03/11/2020       Cardiac Studies/Imaging:   I have reviewed the following studies below:      EKG 5/22 NSR with PVCs    Assessment/Plan:   62 y.o. female with the following medical problems:    SVT (Holter 2020)  MEDRANO  Fatigue  HLD    - Will obtaing ECHO and refer for sleep study given fatigue, day time somnolence and hx of SVT with MEDRANO and LE edema  - Will check lipid panel, if LDL still elevated (148 on last labs), increase Lipitor to 40mg  - PVCs on EKG, if she has frequent palpitations in future, consider longer monitoring  - Continue Atenolol and Lipitor 20mg  - TSH wnl 2 years ago, will repeat  - Advised compression stockings    Return to clinic in 3 months   used # [id number]      Nava Vaughan  PGY 5  [employer] [facility]  Attending Addendum to follow

## 2022-07-26 NOTE — PROGRESS NOTES
Cardiology Attending    I have discussed Ms. Leonora Barnes including her symptoms, findings, and management plan with the cardiology fellow.  I agree with the findings and recommendations of the Cardiology Clinic Note.        Saul Magallon MD

## 2022-08-05 ENCOUNTER — LAB VISIT (OUTPATIENT)
Dept: LAB | Facility: HOSPITAL | Age: 62
End: 2022-08-05
Attending: STUDENT IN AN ORGANIZED HEALTH CARE EDUCATION/TRAINING PROGRAM
Payer: MEDICAID

## 2022-08-05 DIAGNOSIS — E78.2 MIXED HYPERLIPIDEMIA: ICD-10-CM

## 2022-08-05 DIAGNOSIS — R53.83 FATIGUE, UNSPECIFIED TYPE: ICD-10-CM

## 2022-08-05 LAB
CHOLEST SERPL-MCNC: 189 MG/DL
CHOLEST/HDLC SERPL: 3 {RATIO} (ref 0–5)
HDLC SERPL-MCNC: 70 MG/DL (ref 35–60)
LDLC SERPL CALC-MCNC: 102 MG/DL (ref 50–140)
TRIGL SERPL-MCNC: 83 MG/DL (ref 37–140)
TSH SERPL-ACNC: 2.28 UIU/ML (ref 0.35–4.94)
VLDLC SERPL CALC-MCNC: 17 MG/DL

## 2022-08-05 PROCEDURE — 36415 COLL VENOUS BLD VENIPUNCTURE: CPT

## 2022-08-05 PROCEDURE — 84443 ASSAY THYROID STIM HORMONE: CPT

## 2022-08-05 PROCEDURE — 80061 LIPID PANEL: CPT

## 2022-08-12 ENCOUNTER — OFFICE VISIT (OUTPATIENT)
Dept: FAMILY MEDICINE | Facility: CLINIC | Age: 62
End: 2022-08-12
Payer: MEDICAID

## 2022-08-12 VITALS
SYSTOLIC BLOOD PRESSURE: 124 MMHG | TEMPERATURE: 98 F | DIASTOLIC BLOOD PRESSURE: 82 MMHG | RESPIRATION RATE: 18 BRPM | WEIGHT: 167 LBS | HEART RATE: 66 BPM | OXYGEN SATURATION: 100 % | HEIGHT: 66 IN | BODY MASS INDEX: 26.84 KG/M2

## 2022-08-12 DIAGNOSIS — E78.5 HYPERLIPIDEMIA, UNSPECIFIED HYPERLIPIDEMIA TYPE: ICD-10-CM

## 2022-08-12 DIAGNOSIS — K59.00 CONSTIPATION, UNSPECIFIED CONSTIPATION TYPE: Primary | ICD-10-CM

## 2022-08-12 DIAGNOSIS — I20.9 ANGINA PECTORIS: ICD-10-CM

## 2022-08-12 DIAGNOSIS — R51.9 NONINTRACTABLE HEADACHE, UNSPECIFIED CHRONICITY PATTERN, UNSPECIFIED HEADACHE TYPE: ICD-10-CM

## 2022-08-12 PROCEDURE — 1159F PR MEDICATION LIST DOCUMENTED IN MEDICAL RECORD: ICD-10-PCS | Mod: CPTII,,, | Performed by: FAMILY MEDICINE

## 2022-08-12 PROCEDURE — 3074F PR MOST RECENT SYSTOLIC BLOOD PRESSURE < 130 MM HG: ICD-10-PCS | Mod: CPTII,,, | Performed by: FAMILY MEDICINE

## 2022-08-12 PROCEDURE — 3008F PR BODY MASS INDEX (BMI) DOCUMENTED: ICD-10-PCS | Mod: CPTII,,, | Performed by: FAMILY MEDICINE

## 2022-08-12 PROCEDURE — 3079F PR MOST RECENT DIASTOLIC BLOOD PRESSURE 80-89 MM HG: ICD-10-PCS | Mod: CPTII,,, | Performed by: FAMILY MEDICINE

## 2022-08-12 PROCEDURE — 3074F SYST BP LT 130 MM HG: CPT | Mod: CPTII,,, | Performed by: FAMILY MEDICINE

## 2022-08-12 PROCEDURE — 3008F BODY MASS INDEX DOCD: CPT | Mod: CPTII,,, | Performed by: FAMILY MEDICINE

## 2022-08-12 PROCEDURE — 1159F MED LIST DOCD IN RCRD: CPT | Mod: CPTII,,, | Performed by: FAMILY MEDICINE

## 2022-08-12 PROCEDURE — 99204 OFFICE O/P NEW MOD 45 MIN: CPT | Mod: S$PBB,,, | Performed by: FAMILY MEDICINE

## 2022-08-12 PROCEDURE — 3079F DIAST BP 80-89 MM HG: CPT | Mod: CPTII,,, | Performed by: FAMILY MEDICINE

## 2022-08-12 PROCEDURE — 99204 PR OFFICE/OUTPT VISIT, NEW, LEVL IV, 45-59 MIN: ICD-10-PCS | Mod: S$PBB,,, | Performed by: FAMILY MEDICINE

## 2022-08-12 PROCEDURE — 99215 OFFICE O/P EST HI 40 MIN: CPT | Mod: PBBFAC | Performed by: FAMILY MEDICINE

## 2022-08-12 RX ORDER — DOCUSATE SODIUM 100 MG/1
100 CAPSULE, LIQUID FILLED ORAL DAILY PRN
Qty: 100 CAPSULE | Refills: 1 | Status: SHIPPED | OUTPATIENT
Start: 2022-08-12 | End: 2023-08-09

## 2022-08-12 RX ORDER — DOCUSATE SODIUM 100 MG/1
1 CAPSULE, LIQUID FILLED ORAL DAILY PRN
COMMUNITY
Start: 2022-02-10 | End: 2022-08-12 | Stop reason: SDUPTHER

## 2022-08-12 RX ORDER — BUTALBITAL, ACETAMINOPHEN AND CAFFEINE 50; 325; 40 MG/1; MG/1; MG/1
1 TABLET ORAL EVERY 4 HOURS PRN
Qty: 30 TABLET | Refills: 1 | Status: SHIPPED | OUTPATIENT
Start: 2022-08-12 | End: 2022-09-11

## 2022-08-12 RX ORDER — DULOXETIN HYDROCHLORIDE 30 MG/1
1 CAPSULE, DELAYED RELEASE ORAL DAILY PRN
COMMUNITY
End: 2023-05-08

## 2022-08-12 RX ORDER — NAPROXEN 500 MG/1
1 TABLET ORAL EVERY 12 HOURS PRN
COMMUNITY
End: 2023-02-07 | Stop reason: SDUPTHER

## 2022-08-12 RX ORDER — GABAPENTIN 600 MG/1
1 TABLET ORAL NIGHTLY
COMMUNITY
End: 2023-05-08

## 2022-08-12 RX ORDER — ATORVASTATIN CALCIUM 20 MG/1
20 TABLET, FILM COATED ORAL DAILY
Qty: 90 TABLET | Refills: 1 | Status: SHIPPED | OUTPATIENT
Start: 2022-08-12 | End: 2023-02-07 | Stop reason: SDUPTHER

## 2022-08-12 RX ORDER — SYRING-NEEDL,DISP,INSUL,0.3 ML 29 G X1/2"
150 SYRINGE, EMPTY DISPOSABLE MISCELLANEOUS 2 TIMES DAILY
COMMUNITY

## 2022-08-12 NOTE — PROGRESS NOTES
Leonora Brightrera  08/12/2022  [id number]              Chief Complaint:  Establish care    History of Present Illness:  62y f PMH angina, SVT, constipation, migraine headache presents to establish care. Previously followed with Dr. Buchanan. Has since started seeing [facility].  No new complaints  Would like medications refilled  Did recent labs    History:  No past medical history on file.  Past Surgical History:   Procedure Laterality Date    CARDIAC SURGERY       No family history on file.  Social History     Socioeconomic History    Marital status: Single   Tobacco Use    Smoking status: Never Smoker    Smokeless tobacco: Never Used   Substance and Sexual Activity    Alcohol use: Never    Drug use: Never     There is no problem list on file for this patient.    Review of patient's allergies indicates:   Allergen Reactions    Aspirin Other (See Comments)     Other reaction(s): causes stomach pain  Pt reports ggi upset           Medications:  Current Outpatient Medications on File Prior to Visit   Medication Sig Dispense Refill    atenoloL (TENORMIN) 25 MG tablet Take 25 mg by mouth once daily at 6am.      atorvastatin (LIPITOR) 20 MG tablet Take 20 mg by mouth once daily.      docusate sodium (COLACE) 100 MG capsule Take 1 capsule by mouth daily as needed.      DULoxetine (CYMBALTA) 30 MG capsule Take 1 capsule by mouth daily as needed.      linaCLOtide (LINZESS) 145 mcg Cap capsule Take 145 mcg by mouth once daily at 6am.      naproxen (NAPROSYN) 500 MG tablet Take 1 tablet by mouth every 12 (twelve) hours as needed.      nitroGLYCERIN (NITROSTAT) 0.4 MG SL tablet Place 0.4 mg under the tongue every 5 (five) minutes as needed.      pantoprazole (PROTONIX) 40 MG tablet Take 40 mg by mouth once daily at 6am.      pravastatin (PRAVACHOL) 10 MG tablet Take 10 mg by mouth once daily at 6am.      sumatriptan (IMITREX) 100 MG tablet Take 1 tablet by mouth as needed.      cyclobenzaprine (FLEXERIL) 10 MG  tablet Take 10 mg by mouth every evening.      gabapentin (NEURONTIN) 600 MG tablet Take 1 tablet by mouth every evening.      magnesium citrate solution Take 150 mLs by mouth 2 (two) times a day.       No current facility-administered medications on file prior to visit.       Medications have been reviewed and reconciled with patient at this visit.    Adverse reactions to current medications reviewed with patient..      Exam:  Wt Readings from Last 3 Encounters:   08/12/22 75.8 kg (167 lb)   07/26/22 74.4 kg (164 lb 0.4 oz)   05/26/22 76 kg (167 lb 8.8 oz)     Temp Readings from Last 3 Encounters:   08/12/22 97.7 °F (36.5 °C) (Oral)   07/26/22 98.8 °F (37.1 °C) (Oral)   05/26/22 98.2 °F (36.8 °C) (Oral)     BP Readings from Last 3 Encounters:   08/12/22 124/82   07/26/22 98/61   05/26/22 105/73     Pulse Readings from Last 3 Encounters:   08/12/22 66   07/26/22 62   05/26/22 (!) 56     Body mass index is 26.95 kg/m².      ROS:  See HPI for details    Constitutional: Denies Change in appetite. Denies Chills. Denies Fever. Denies Night sweats.  Eye: Denies Blurred vision. Denies Discharge. Denies Eye pain.  ENT: Denies Decreased hearing. Denies Sore throat. Denies Swollen glands.  Respiratory: Denies Cough. Denies Shortness of breath. Denies Shortness of breath with exertion. Denies Wheezing.  Cardiovascular: Denies Chest pain at rest. Denies Chest pain with exertion. Denies Irregular heartbeat. Denies Palpitations.  Gastrointestinal: Denies Abdominal pain. Denies Diarrhea. Denies Nausea. Denies Vomiting. Denies Hematemesis or Hematochezia.  Genitourinary: Denies Dysuria. Denies Urinary frequency. Denies Urinary urgency. Denies Blood in urine.  Endocrine: Denies Cold intolerance. Denies Excessive thirst. Denies Heat intolerance. Denies Weight loss. Denies Weight gain.  Musculoskeletal: Denies Painful joints. Denies Weakness.      All Other ROS: Negative except as stated in HPI.    PE:  General: Alert and oriented,  No acute distress.  Head: Normocephalic, Atraumatic.  Eye: Pupils are equal, round and reactive to light, Extraocular movements are intact, Sclera non-icteric.  Ears/Nose/Throat: Normal, Mucosa moist,Clear.  Neck/Thyroid: Supple, Non-tender, No carotid bruit, No palpable thyromegaly or thyroid nodule, No lymphadenopathy, No JVD, Full range of motion.  Respiratory: Clear to auscultation bilaterally; No wheezes, rales or rhonchi,Non-labored respirations, Symmetrical chest wall expansion.  Cardiovascular: Regular rate and rhythm, S1/S2 normal, No murmurs, rubs or gallops.  Gastrointestinal: Soft, Non-tender, Non-distended, Normal bowel sounds, No palpable organomegaly.  Musculoskeletal: Normal range of motion.  Integumentary: Warm, Dry, Intact, No suspicious lesions or rashes.  Extremities: No clubbing, cyanosis or edema  fect    Laboratory Reviewed ({Yes)  Lab Results   Component Value Date    WBC 7.9 05/26/2022    HGB 12.6 05/26/2022    HCT 40.4 05/26/2022    PLT 85 (L) 05/26/2022    CHOL 189 08/05/2022    TRIG 83 08/05/2022    HDL 70 (H) 08/05/2022    ALT 17 05/26/2022    AST 21 05/26/2022     05/26/2022    K 4.2 05/26/2022    CREATININE 0.82 05/26/2022    BUN 12.4 05/26/2022    CO2 24 05/26/2022    TSH 2.2815 08/05/2022    INR 0.98 05/25/2020    HGBA1C 5.7 03/11/2020       Leonora was seen today for medication refill and arm pain.    Diagnoses and all orders for this visit:    Constipation, unspecified constipation type    Hyperlipidemia, unspecified hyperlipidemia type    Angina pectoris    Nonintractable headache, unspecified chronicity pattern, unspecified headache type      Medications refilled  Patient considering follow up at [facility]  Keep follow up with cards   used          Care Plan/Goals: Reviewed    Goals    None         Follow up: Follow up in about 6 months (around 2/12/2023).

## 2022-09-12 ENCOUNTER — HOSPITAL ENCOUNTER (OUTPATIENT)
Dept: CARDIOLOGY | Facility: HOSPITAL | Age: 62
Discharge: HOME | End: 2022-09-12
Attending: STUDENT IN AN ORGANIZED HEALTH CARE EDUCATION/TRAINING PROGRAM
Payer: MEDICAID

## 2022-09-12 DIAGNOSIS — I47.10 SVT (SUPRAVENTRICULAR TACHYCARDIA): ICD-10-CM

## 2022-09-12 PROCEDURE — 93306 TTE W/DOPPLER COMPLETE: CPT

## 2022-09-13 LAB
AV INDEX (PROSTH): 0.63
AV MEAN GRADIENT: 7 MMHG
AV PEAK GRADIENT: 14 MMHG
AV VALVE AREA: 1.87 CM2
AV VELOCITY RATIO: 0.64
CV ECHO LV RWT: 0.4 CM
DOP CALC AO PEAK VEL: 1.85 M/S
DOP CALC AO VTI: 42.8 CM
DOP CALC LVOT AREA: 3 CM2
DOP CALC LVOT DIAMETER: 1.95 CM
DOP CALC LVOT PEAK VEL: 1.19 M/S
DOP CALC LVOT STROKE VOLUME: 80 CM3
DOP CALC MV VTI: 30.6 CM
DOP CALCLVOT PEAK VEL VTI: 26.8 CM
E WAVE DECELERATION TIME: 293.83 MSEC
E/A RATIO: 1.87
E/E' RATIO: 6.08 M/S
ECHO LV POSTERIOR WALL: 0.83 CM (ref 0.6–1.1)
EJECTION FRACTION: 70 %
FRACTIONAL SHORTENING: 41 % (ref 28–44)
HR MV ECHO: 69 BPM
INTERVENTRICULAR SEPTUM: 0.81 CM (ref 0.6–1.1)
LEFT ATRIUM SIZE: 4.36 CM
LEFT ATRIUM VOLUME MOD: 36.24 CM3
LEFT INTERNAL DIMENSION IN SYSTOLE: 2.44 CM (ref 2.1–4)
LEFT VENTRICLE DIASTOLIC VOLUME: 76.99 ML
LEFT VENTRICLE SYSTOLIC VOLUME: 21.11 ML
LEFT VENTRICULAR INTERNAL DIMENSION IN DIASTOLE: 4.16 CM (ref 3.5–6)
LEFT VENTRICULAR MASS: 103.04 G
LV LATERAL E/E' RATIO: 5.21 M/S
LV SEPTAL E/E' RATIO: 7.3 M/S
LVOT MG: 2.89 MMHG
LVOT MV: 0.79 CM/S
MV MEAN GRADIENT: 2 MMHG
MV PEAK A VEL: 0.39 M/S
MV PEAK E VEL: 0.73 M/S
MV PEAK GRADIENT: 5 MMHG
MV VALVE AREA BY CONTINUITY EQUATION: 2.61 CM2
PISA MRMAX VEL: 4.54 M/S
PISA TR MAX VEL: 2.87 M/S
RA PRESSURE: 3 MMHG
RA WIDTH: 4.6 CM
TDI LATERAL: 0.14 M/S
TDI SEPTAL: 0.1 M/S
TDI: 0.12 M/S
TR MAX PG: 33 MMHG
TRICUSPID ANNULAR PLANE SYSTOLIC EXCURSION: 2.27 CM
TV REST PULMONARY ARTERY PRESSURE: 36 MMHG

## 2023-02-07 ENCOUNTER — OFFICE VISIT (OUTPATIENT)
Dept: FAMILY MEDICINE | Facility: CLINIC | Age: 63
End: 2023-02-07
Payer: MEDICAID

## 2023-02-07 VITALS
BODY MASS INDEX: 29.2 KG/M2 | RESPIRATION RATE: 18 BRPM | DIASTOLIC BLOOD PRESSURE: 65 MMHG | SYSTOLIC BLOOD PRESSURE: 108 MMHG | HEART RATE: 61 BPM | TEMPERATURE: 98 F | WEIGHT: 181.69 LBS | HEIGHT: 66 IN

## 2023-02-07 DIAGNOSIS — M25.512 CHRONIC LEFT SHOULDER PAIN: ICD-10-CM

## 2023-02-07 DIAGNOSIS — Z12.11 SCREEN FOR COLON CANCER: ICD-10-CM

## 2023-02-07 DIAGNOSIS — Z23 IMMUNIZATION DUE: ICD-10-CM

## 2023-02-07 DIAGNOSIS — M54.2 NECK PAIN: ICD-10-CM

## 2023-02-07 DIAGNOSIS — I47.10 SVT (SUPRAVENTRICULAR TACHYCARDIA): ICD-10-CM

## 2023-02-07 DIAGNOSIS — G89.29 CHRONIC LEFT SHOULDER PAIN: ICD-10-CM

## 2023-02-07 DIAGNOSIS — E78.5 HYPERLIPIDEMIA, UNSPECIFIED HYPERLIPIDEMIA TYPE: ICD-10-CM

## 2023-02-07 DIAGNOSIS — K21.9 GASTROESOPHAGEAL REFLUX DISEASE, UNSPECIFIED WHETHER ESOPHAGITIS PRESENT: ICD-10-CM

## 2023-02-07 DIAGNOSIS — G43.809 OTHER MIGRAINE WITHOUT STATUS MIGRAINOSUS, NOT INTRACTABLE: Primary | ICD-10-CM

## 2023-02-07 PROCEDURE — 90472 IMMUNIZATION ADMIN EACH ADD: CPT | Mod: PBBFAC

## 2023-02-07 PROCEDURE — 99214 OFFICE O/P EST MOD 30 MIN: CPT | Mod: S$PBB,,, | Performed by: FAMILY MEDICINE

## 2023-02-07 PROCEDURE — 3078F DIAST BP <80 MM HG: CPT | Mod: CPTII,,, | Performed by: FAMILY MEDICINE

## 2023-02-07 PROCEDURE — 3078F PR MOST RECENT DIASTOLIC BLOOD PRESSURE < 80 MM HG: ICD-10-PCS | Mod: CPTII,,, | Performed by: FAMILY MEDICINE

## 2023-02-07 PROCEDURE — 90686 IIV4 VACC NO PRSV 0.5 ML IM: CPT | Mod: PBBFAC

## 2023-02-07 PROCEDURE — 1159F MED LIST DOCD IN RCRD: CPT | Mod: CPTII,,, | Performed by: FAMILY MEDICINE

## 2023-02-07 PROCEDURE — 1159F PR MEDICATION LIST DOCUMENTED IN MEDICAL RECORD: ICD-10-PCS | Mod: CPTII,,, | Performed by: FAMILY MEDICINE

## 2023-02-07 PROCEDURE — 3008F BODY MASS INDEX DOCD: CPT | Mod: CPTII,,, | Performed by: FAMILY MEDICINE

## 2023-02-07 PROCEDURE — 90750 HZV VACC RECOMBINANT IM: CPT | Mod: PBBFAC

## 2023-02-07 PROCEDURE — 90471 IMMUNIZATION ADMIN: CPT | Mod: PBBFAC

## 2023-02-07 PROCEDURE — 3074F SYST BP LT 130 MM HG: CPT | Mod: CPTII,,, | Performed by: FAMILY MEDICINE

## 2023-02-07 PROCEDURE — 3074F PR MOST RECENT SYSTOLIC BLOOD PRESSURE < 130 MM HG: ICD-10-PCS | Mod: CPTII,,, | Performed by: FAMILY MEDICINE

## 2023-02-07 PROCEDURE — 3008F PR BODY MASS INDEX (BMI) DOCUMENTED: ICD-10-PCS | Mod: CPTII,,, | Performed by: FAMILY MEDICINE

## 2023-02-07 PROCEDURE — 99214 OFFICE O/P EST MOD 30 MIN: CPT | Mod: PBBFAC | Performed by: FAMILY MEDICINE

## 2023-02-07 PROCEDURE — 99214 PR OFFICE/OUTPT VISIT, EST, LEVL IV, 30-39 MIN: ICD-10-PCS | Mod: S$PBB,,, | Performed by: FAMILY MEDICINE

## 2023-02-07 RX ORDER — SUMATRIPTAN SUCCINATE 100 MG/1
100 TABLET ORAL
Qty: 10 TABLET | Refills: 1 | Status: SHIPPED | OUTPATIENT
Start: 2023-02-07 | End: 2023-05-08 | Stop reason: SDUPTHER

## 2023-02-07 RX ORDER — ATORVASTATIN CALCIUM 20 MG/1
20 TABLET, FILM COATED ORAL DAILY
Qty: 90 TABLET | Refills: 1 | Status: SHIPPED | OUTPATIENT
Start: 2023-02-07 | End: 2023-08-01

## 2023-02-07 RX ORDER — NAPROXEN 500 MG/1
500 TABLET ORAL 2 TIMES DAILY WITH MEALS
Qty: 30 TABLET | Refills: 0 | Status: SHIPPED | OUTPATIENT
Start: 2023-02-07 | End: 2023-02-22

## 2023-02-07 RX ORDER — PANTOPRAZOLE SODIUM 40 MG/1
40 TABLET, DELAYED RELEASE ORAL DAILY
Qty: 90 TABLET | Refills: 1 | Status: SHIPPED | OUTPATIENT
Start: 2023-02-07 | End: 2023-10-02

## 2023-02-07 RX ADMIN — INFLUENZA VIRUS VACCINE 0.5 ML: 15; 15; 15; 15 SUSPENSION INTRAMUSCULAR at 11:02

## 2023-02-07 RX ADMIN — ZOSTER VACCINE RECOMBINANT, ADJUVANTED 0.5 ML: KIT at 11:02

## 2023-02-07 NOTE — PROGRESS NOTES
"Patient Name: Leonora Barnes   : 1960  MRN: [id number]     SUBJECTIVE:  Leonora Barnes is a 63 y.o. female here for Follow-up  .    HPI  PMHx of hypertension, hyperlipidemia, migraine, SVT here for routine follow up.  Follows up [facility]. Last time she saw them was 6 months ago. Questionable hx of cardiac surgery "due to a hole in heart".   States she takes atenolol only as "needed when sob". When looking down she will feel lightheaded. She does not check bp at home. Unsure if it is low.  Explained importance of being compliant.   Bp well controlled. No palpitations since on atenolol.  Takes atorvastatin 20 mg qd   Imitrex for migraine. Uses it every week, once or twice. Resolves the pain, rarely using it twice in 24 hrs.  Chronic left shoulder pain. Would like to see a specialist. Refusing PT.  Sometimes will have posterior neck pain over cervical vertebrae. Non radiating.   was tried multiple times, unable to reach for 15 min trying. Used patient's daughter to help with translating.    Pap smear next visit April with gynecology    ALLERGIES:   Review of patient's allergies indicates:  No Known Allergies        ROS:  Review of Systems   Constitutional:  Negative for chills, fever and weight loss.   HENT:  Negative for congestion, ear pain and sore throat.    Eyes:  Negative for blurred vision and pain.   Respiratory:  Negative for cough, shortness of breath and wheezing.    Cardiovascular:  Negative for chest pain, palpitations, leg swelling and PND.   Gastrointestinal:  Negative for abdominal pain, blood in stool, constipation, diarrhea, nausea and vomiting.   Genitourinary:  Negative for dysuria, flank pain and hematuria.   Musculoskeletal:  Positive for joint pain and neck pain. Negative for falls and myalgias.   Skin:  Negative for rash.   Neurological:  Positive for headaches. Negative for dizziness and focal weakness.   Psychiatric/Behavioral:  Negative for " "depression and substance abuse. The patient is not nervous/anxious.        OBJECTIVE:  Vital signs  Vitals:    02/07/23 0944   BP: 108/65   Pulse: 61   Resp: 18   Temp: 97.7 °F (36.5 °C)   Weight: 82.4 kg (181 lb 10.5 oz)   Height: 5' 6" (1.676 m)      Body mass index is 29.32 kg/m².    PHYSICAL EXAM:   Physical Exam  Vitals reviewed.   Constitutional:       General: She is not in acute distress.     Appearance: Normal appearance. She is not ill-appearing.   HENT:      Head: Normocephalic and atraumatic.      Right Ear: External ear normal.      Left Ear: External ear normal.      Nose: Nose normal. No rhinorrhea.      Mouth/Throat:      Mouth: Mucous membranes are moist.   Eyes:      General: No scleral icterus.        Right eye: No discharge.         Left eye: No discharge.      Conjunctiva/sclera: Conjunctivae normal.      Pupils: Pupils are equal, round, and reactive to light.   Cardiovascular:      Rate and Rhythm: Normal rate and regular rhythm.      Heart sounds: No murmur heard.  Pulmonary:      Effort: Pulmonary effort is normal. No respiratory distress.      Breath sounds: No wheezing, rhonchi or rales.   Abdominal:      General: Bowel sounds are normal. There is no distension.      Palpations: Abdomen is soft.      Tenderness: There is no abdominal tenderness.   Musculoskeletal:         General: Tenderness (anterior left shoulder. able to do all ROM.) present. No swelling. Normal range of motion.      Cervical back: Normal range of motion and neck supple. Tenderness (mild prevertebral muscle tenderness cervical) present. No rigidity.      Right lower leg: No edema.      Left lower leg: No edema.   Skin:     General: Skin is warm.      Coloration: Skin is not pale.      Findings: No rash.   Neurological:      General: No focal deficit present.      Mental Status: She is alert and oriented to person, place, and time.      Sensory: No sensory deficit.      Motor: No weakness.   Psychiatric:         Mood and " Affect: Mood normal.         Behavior: Behavior normal.        ASSESSMENT/PLAN:  1. Other migraine without status migrainosus, not intractable  -     sumatriptan (IMITREX) 100 MG tablet; Take 1 tablet (100 mg total) by mouth as needed for Migraine. Can repeat in 2 hours. Max 2 tabs in 24 hours  Dispense: 10 tablet; Refill: 1    2. Gastroesophageal reflux disease, unspecified whether esophagitis present  -     pantoprazole (PROTONIX) 40 MG tablet; Take 1 tablet (40 mg total) by mouth once daily.  Dispense: 90 tablet; Refill: 1    3. SVT (supraventricular tachycardia)  -     Hemoglobin A1C; Future; Expected date: 02/07/2023    4. Hyperlipidemia, unspecified hyperlipidemia type  -     atorvastatin (LIPITOR) 20 MG tablet; Take 1 tablet (20 mg total) by mouth once daily.  Dispense: 90 tablet; Refill: 1  -     CBC Auto Differential; Future; Expected date: 02/07/2023  -     Comprehensive Metabolic Panel; Future; Expected date: 02/07/2023  -     Lipid Panel; Future; Expected date: 02/07/2023  -     Hemoglobin A1C; Future; Expected date: 02/07/2023    5. Chronic left shoulder pain  -     X-Ray Shoulder 2 or More Views Left; Future; Expected date: 02/07/2023  -     naproxen (NAPROSYN) 500 MG tablet; Take 1 tablet (500 mg total) by mouth 2 (two) times daily with meals.  Dispense: 30 tablet; Refill: 0  -     Ambulatory referral/consult to Orthopedics; Future; Expected date: 02/14/2023    6. Neck pain  -     X-Ray Cervical Spine 2 or 3 Views; Future; Expected date: 02/07/2023    7. Screen for colon cancer  -     Cologuard Screening (Multitarget Stool DNA); Future; Expected date: 02/07/2023    8. Immunization due  -     influenza (QUADRIVALENT PF) vaccine 0.5 mL  -     varicella-zoster [id number] (PF)(SHINGRIX) 50 mcg/0.5 mL injection       PLAN  - headaches resolved with imitrex. Refill. Will discuss preventative treatment next visit, declining for now.  - GERD well controlled on protonix  - encouraged compliance with atenolol.  Continue to follow with cardiology. No palpitations lately  - XR of the shoulder and neck to have basic imaging. None recently. Suspect degenerative changed. Refusing PT but would like to see a specialist. In the meantime naproxen for pain. Discussed to avoid other NSAIDs and take tylenol in addition if needed for pain.  - update preventative healthcare          Previous medical history/lab work/radiology reviewed and considered during medical management decisions.   Medication list reviewed and medication reconciliation performed.  Patient was provided  and care about his/her current diagnosis (es) and medications including risk/benefit and side effects/adverse events, over the counter medication uses/doses, home self-care and contact precautions,  and red flags and indications for when to seek immediate medical attention.   Patient was advised to continue compliance with current medication list and medical recommendations.  Recommended/ Advised continued compliance with recommended eating habits/ diets for medical conditions and exercise 150 minutes/ week (if possible) for medical condition (s).        RESULTS:  No results found for this or any previous visit (from the past 1008 hour(s)).      Follow Up:  Follow up in about 3 months (around 5/7/2023) for extended visit, needs .         This note was created with the assistance of a voice recognition software or phone dictation. There may be transcription errors as a result of using this technology however minimal. Effort has been made to assure accuracy of transcription but any obvious errors or omissions should be clarified with the author of the document

## 2023-02-16 DIAGNOSIS — E78.5 HYPERLIPIDEMIA, UNSPECIFIED HYPERLIPIDEMIA TYPE: ICD-10-CM

## 2023-02-16 NOTE — TELEPHONE ENCOUNTER
Request sent  ----- Message from Pratibha Cheek sent at 2/16/2023  9:03 AM CST -----  Regarding: Refill  Provider: Dr Teresa Castillo  [facility]  Last Visit:  Next Visit: 5/8/2023  Patient's Contact Number:    1. Name of Medication: Atorvastatin  Dosage: 20 mg tab  Comments:    2. Name of Medication:  Dosage:  Comments:    3. Name of Medication:  Dosage:  Comments    4. Name of Medication:  Dosage:  Comments:    5. Name of Medication:  Dosage:  Comments:

## 2023-02-22 RX ORDER — ATORVASTATIN CALCIUM 20 MG/1
20 TABLET, FILM COATED ORAL DAILY
Qty: 90 TABLET | Refills: 1 | OUTPATIENT
Start: 2023-02-22

## 2023-03-21 ENCOUNTER — DOCUMENTATION ONLY (OUTPATIENT)
Dept: ADMINISTRATIVE | Facility: HOSPITAL | Age: 63
End: 2023-03-21
Payer: MEDICAID

## 2023-03-21 LAB — NONINV COLON CA DNA+OCC BLD SCRN STL QL: NEGATIVE

## 2023-04-05 ENCOUNTER — OFFICE VISIT (OUTPATIENT)
Dept: GYNECOLOGY | Facility: CLINIC | Age: 63
End: 2023-04-05
Payer: MEDICAID

## 2023-04-05 VITALS
TEMPERATURE: 98 F | SYSTOLIC BLOOD PRESSURE: 116 MMHG | OXYGEN SATURATION: 100 % | BODY MASS INDEX: 28.74 KG/M2 | HEIGHT: 66 IN | WEIGHT: 178.81 LBS | DIASTOLIC BLOOD PRESSURE: 77 MMHG | RESPIRATION RATE: 20 BRPM | HEART RATE: 60 BPM

## 2023-04-05 DIAGNOSIS — Z12.31 SCREENING MAMMOGRAM FOR BREAST CANCER: ICD-10-CM

## 2023-04-05 DIAGNOSIS — Z01.419 WOMEN'S ANNUAL ROUTINE GYNECOLOGICAL EXAMINATION: Primary | ICD-10-CM

## 2023-04-05 PROCEDURE — 3008F PR BODY MASS INDEX (BMI) DOCUMENTED: ICD-10-PCS | Mod: CPTII,,, | Performed by: NURSE PRACTITIONER

## 2023-04-05 PROCEDURE — 99215 OFFICE O/P EST HI 40 MIN: CPT | Mod: PBBFAC | Performed by: NURSE PRACTITIONER

## 2023-04-05 PROCEDURE — 3078F PR MOST RECENT DIASTOLIC BLOOD PRESSURE < 80 MM HG: ICD-10-PCS | Mod: CPTII,,, | Performed by: NURSE PRACTITIONER

## 2023-04-05 PROCEDURE — 3008F BODY MASS INDEX DOCD: CPT | Mod: CPTII,,, | Performed by: NURSE PRACTITIONER

## 2023-04-05 PROCEDURE — 1159F MED LIST DOCD IN RCRD: CPT | Mod: CPTII,,, | Performed by: NURSE PRACTITIONER

## 2023-04-05 PROCEDURE — 3074F SYST BP LT 130 MM HG: CPT | Mod: CPTII,,, | Performed by: NURSE PRACTITIONER

## 2023-04-05 PROCEDURE — 1160F RVW MEDS BY RX/DR IN RCRD: CPT | Mod: CPTII,,, | Performed by: NURSE PRACTITIONER

## 2023-04-05 PROCEDURE — 3078F DIAST BP <80 MM HG: CPT | Mod: CPTII,,, | Performed by: NURSE PRACTITIONER

## 2023-04-05 PROCEDURE — 3074F PR MOST RECENT SYSTOLIC BLOOD PRESSURE < 130 MM HG: ICD-10-PCS | Mod: CPTII,,, | Performed by: NURSE PRACTITIONER

## 2023-04-05 PROCEDURE — 99396 PREV VISIT EST AGE 40-64: CPT | Mod: S$PBB,,, | Performed by: NURSE PRACTITIONER

## 2023-04-05 PROCEDURE — 1160F PR REVIEW ALL MEDS BY PRESCRIBER/CLIN PHARMACIST DOCUMENTED: ICD-10-PCS | Mod: CPTII,,, | Performed by: NURSE PRACTITIONER

## 2023-04-05 PROCEDURE — 99396 PR PREVENTIVE VISIT,EST,40-64: ICD-10-PCS | Mod: S$PBB,,, | Performed by: NURSE PRACTITIONER

## 2023-04-05 PROCEDURE — 1159F PR MEDICATION LIST DOCUMENTED IN MEDICAL RECORD: ICD-10-PCS | Mod: CPTII,,, | Performed by: NURSE PRACTITIONER

## 2023-04-05 NOTE — PROGRESS NOTES
"Patient ID: Leonora Barnes is a 63 y.o. female.    Chief Complaint: Gynecologic Exam      Review of patient's allergies indicates:  No Known Allergies          HPI:  Pt is  here for annual gyn exam. Denies hx of abnormal pap. Last pap 3/30/2022-NIL; HPV 16/18/45 negative. Pt is postmenopausal. Denies vaginal bleeding/discharge. Denies pain. NO gyn complaints. Denies fly hx of breast, ovarian, uterine, or colon cancer. Pt is nonsmoker. Pt is [place] speaking. Communicated with pt via language line using the i-pad  Brennan #[id number]  PMHx Migraines, HTN, HLD  PSHx csection x 2; heart surgery (unable to specify)    Review of Systems:   Negative except for findings in HPI     Objective:   /77 (BP Location: Left arm, Patient Position: Sitting, BP Method: Medium (Automatic))   Pulse 60   Temp 97.9 °F (36.6 °C) (Oral)   Resp 20   Ht 5' 6" (1.676 m)   Wt 81.1 kg (178 lb 12.8 oz)   SpO2 100%   BMI 28.86 kg/m²    Physical Exam:  General: Alert and oriented, No acute distress.  Breast: No mass, No tenderness, No discharge.   Gastrointestinal: Soft, Non-tender.vertical scar  Gynecology:  Labia: Bilateral, Majora, Minora, Within normal limits.  Vagina: pale; dry mucosa  Cervix: No cervical motion tenderness, No lesions.  Uterus: appears partially adhered to abdominal wall, Not tender.  Ovaries: Not tender, No mass.  Integumentary: Warm, Dry.  Neurologic: Alert, Oriented.  Psychiatric: Cooperative, Appropriate mood & affect.  Assessment:   Women's annual routine gynecological examination    Screening mammogram for breast cancer  -     Mammo Digital Screening Bilat w/ Scott; Future; Expected date: 2023            1. Women's annual routine gynecological examination    2. Screening mammogram for breast cancer             -pelvic; pap UTD  -Contact clinic with any vaginal bleeding as this would be abnormal in postmenopausal pt. Encouraged dietary or supplemental intake of calcium 600mg +Vit D " 400 IU twice daily for osteoporosis prevention.    Plan:       Follow up in about 1 year (around 4/5/2024).

## 2023-04-19 ENCOUNTER — HOSPITAL ENCOUNTER (OUTPATIENT)
Dept: RADIOLOGY | Facility: HOSPITAL | Age: 63
Discharge: HOME | End: 2023-04-19
Attending: NURSE PRACTITIONER
Payer: MEDICAID

## 2023-04-19 DIAGNOSIS — Z12.31 SCREENING MAMMOGRAM FOR BREAST CANCER: ICD-10-CM

## 2023-04-19 PROCEDURE — 77067 SCR MAMMO BI INCL CAD: CPT | Mod: 26,,, | Performed by: RADIOLOGY

## 2023-04-19 PROCEDURE — 77063 MAMMO DIGITAL SCREENING BILAT WITH TOMO: ICD-10-PCS | Mod: 26,,, | Performed by: RADIOLOGY

## 2023-04-19 PROCEDURE — 77063 BREAST TOMOSYNTHESIS BI: CPT | Mod: 26,,, | Performed by: RADIOLOGY

## 2023-04-19 PROCEDURE — 77067 MAMMO DIGITAL SCREENING BILAT WITH TOMO: ICD-10-PCS | Mod: 26,,, | Performed by: RADIOLOGY

## 2023-04-19 PROCEDURE — 77067 SCR MAMMO BI INCL CAD: CPT | Mod: TC

## 2023-05-08 ENCOUNTER — OFFICE VISIT (OUTPATIENT)
Dept: FAMILY MEDICINE | Facility: CLINIC | Age: 63
End: 2023-05-08
Payer: MEDICAID

## 2023-05-08 VITALS
SYSTOLIC BLOOD PRESSURE: 109 MMHG | DIASTOLIC BLOOD PRESSURE: 71 MMHG | OXYGEN SATURATION: 100 % | BODY MASS INDEX: 29.66 KG/M2 | WEIGHT: 178 LBS | HEIGHT: 65 IN | HEART RATE: 60 BPM | TEMPERATURE: 98 F | RESPIRATION RATE: 18 BRPM

## 2023-05-08 DIAGNOSIS — G89.29 CHRONIC MIDLINE LOW BACK PAIN WITHOUT SCIATICA: ICD-10-CM

## 2023-05-08 DIAGNOSIS — M79.605 PAIN IN BOTH LOWER EXTREMITIES: ICD-10-CM

## 2023-05-08 DIAGNOSIS — Z23 NEED FOR SHINGLES VACCINE: ICD-10-CM

## 2023-05-08 DIAGNOSIS — G43.809 OTHER MIGRAINE WITHOUT STATUS MIGRAINOSUS, NOT INTRACTABLE: ICD-10-CM

## 2023-05-08 DIAGNOSIS — M79.604 PAIN IN BOTH LOWER EXTREMITIES: ICD-10-CM

## 2023-05-08 DIAGNOSIS — G25.81 RESTLESS LEG SYNDROME: Primary | ICD-10-CM

## 2023-05-08 DIAGNOSIS — M54.50 CHRONIC MIDLINE LOW BACK PAIN WITHOUT SCIATICA: ICD-10-CM

## 2023-05-08 DIAGNOSIS — K59.09 CHRONIC CONSTIPATION: ICD-10-CM

## 2023-05-08 DIAGNOSIS — R77.9 ELEVATED SERUM PROTEIN LEVEL: ICD-10-CM

## 2023-05-08 PROCEDURE — 1160F PR REVIEW ALL MEDS BY PRESCRIBER/CLIN PHARMACIST DOCUMENTED: ICD-10-PCS | Mod: CPTII,,, | Performed by: FAMILY MEDICINE

## 2023-05-08 PROCEDURE — 1159F PR MEDICATION LIST DOCUMENTED IN MEDICAL RECORD: ICD-10-PCS | Mod: CPTII,,, | Performed by: FAMILY MEDICINE

## 2023-05-08 PROCEDURE — 3078F PR MOST RECENT DIASTOLIC BLOOD PRESSURE < 80 MM HG: ICD-10-PCS | Mod: CPTII,,, | Performed by: FAMILY MEDICINE

## 2023-05-08 PROCEDURE — 1159F MED LIST DOCD IN RCRD: CPT | Mod: CPTII,,, | Performed by: FAMILY MEDICINE

## 2023-05-08 PROCEDURE — 3074F PR MOST RECENT SYSTOLIC BLOOD PRESSURE < 130 MM HG: ICD-10-PCS | Mod: CPTII,,, | Performed by: FAMILY MEDICINE

## 2023-05-08 PROCEDURE — 99214 PR OFFICE/OUTPT VISIT, EST, LEVL IV, 30-39 MIN: ICD-10-PCS | Mod: S$PBB,,, | Performed by: FAMILY MEDICINE

## 2023-05-08 PROCEDURE — 99215 OFFICE O/P EST HI 40 MIN: CPT | Mod: PBBFAC | Performed by: FAMILY MEDICINE

## 2023-05-08 PROCEDURE — 90471 IMMUNIZATION ADMIN: CPT | Mod: PBBFAC

## 2023-05-08 PROCEDURE — 1160F RVW MEDS BY RX/DR IN RCRD: CPT | Mod: CPTII,,, | Performed by: FAMILY MEDICINE

## 2023-05-08 PROCEDURE — 90750 HZV VACC RECOMBINANT IM: CPT | Mod: PBBFAC

## 2023-05-08 PROCEDURE — 3008F PR BODY MASS INDEX (BMI) DOCUMENTED: ICD-10-PCS | Mod: CPTII,,, | Performed by: FAMILY MEDICINE

## 2023-05-08 PROCEDURE — 3008F BODY MASS INDEX DOCD: CPT | Mod: CPTII,,, | Performed by: FAMILY MEDICINE

## 2023-05-08 PROCEDURE — 99214 OFFICE O/P EST MOD 30 MIN: CPT | Mod: S$PBB,,, | Performed by: FAMILY MEDICINE

## 2023-05-08 PROCEDURE — 3074F SYST BP LT 130 MM HG: CPT | Mod: CPTII,,, | Performed by: FAMILY MEDICINE

## 2023-05-08 PROCEDURE — 3078F DIAST BP <80 MM HG: CPT | Mod: CPTII,,, | Performed by: FAMILY MEDICINE

## 2023-05-08 RX ORDER — POLYETHYLENE GLYCOL 3350 17 G/17G
17 POWDER, FOR SOLUTION ORAL DAILY
Qty: 850 G | Refills: 0 | Status: SHIPPED | OUTPATIENT
Start: 2023-05-08

## 2023-05-08 RX ORDER — GABAPENTIN 100 MG/1
100 CAPSULE ORAL 3 TIMES DAILY
Qty: 90 CAPSULE | Refills: 2 | Status: SHIPPED | OUTPATIENT
Start: 2023-05-08 | End: 2023-07-29 | Stop reason: SDUPTHER

## 2023-05-08 RX ORDER — SUMATRIPTAN SUCCINATE 100 MG/1
100 TABLET ORAL
Qty: 10 TABLET | Refills: 1 | Status: SHIPPED | OUTPATIENT
Start: 2023-05-08 | End: 2023-07-29 | Stop reason: SDUPTHER

## 2023-05-08 RX ADMIN — ZOSTER VACCINE RECOMBINANT, ADJUVANTED 0.5 ML: KIT at 10:05

## 2023-05-08 NOTE — PROGRESS NOTES
Patient Name: Leonora Barnes   : 1960  MRN: [id number]     SUBJECTIVE:  Leonora Barnes is a 63 y.o. female here for Follow-up (Patient c/o pain in both legs and says the pain has been happening more frequently. Also, states that both hands are itchy/redness since Saturday. )  .    HPI  Here for routine follow up.  used throughout this encounter,   Complaining of bilateral leg pain, worsening, mainly at night from the hip down. Needing to move the legs frequently at night, Ongoing for years. Sometimes when walking a lot, will trigger the pain. Used to be on gabapentin and Cymbalta, but does not remember when and when she stopped.  Sometimes back pain, midline, low back, non radiating, but sometimes comes in the front lower abdominal. No dysuria or hematuria.   Does have chronic constipation. Not taking linzess though has ran out of it few months ago. Has been trying to watch diet, but not taking any colace or mag citatrate that she used to be on.   3-4 times a week of BM, but it's hard stools.   Colonoscopy believes she had one in [place] 6 years ago    Few days ago hand itchiness or even purplish/redness discoloration, dry. Nothing today, resolved. Took aspirin and thinks that resolved it.    Discussed labs. Normal A1C. Lipids elevated. Advised compliance with atorvastatin. Protein elevated.    Traveling for almost a month, requesting imitrex refill. Pharmacy only gave her 2 tabs 2 days ago. Taking imitrex 3-4 times max a month which is the worst for her, usually less than that. Actually taking 50 mg because that's how she was told from previous doctor, but sometimes resolves the pain, sometimes not.      ALLERGIES: Review of patient's allergies indicates:  No Known Allergies      ROS:  Review of Systems   Constitutional:  Negative for chills, fever and weight loss.   HENT:  Negative for congestion, ear pain and sore throat.    Eyes:  Negative for blurred vision and  "pain.   Respiratory:  Negative for cough and shortness of breath.    Cardiovascular:  Negative for chest pain, palpitations and leg swelling.   Gastrointestinal:  Positive for constipation. Negative for abdominal pain, blood in stool, diarrhea, nausea and vomiting.   Genitourinary:  Negative for dysuria and hematuria.   Musculoskeletal:  Positive for back pain and myalgias.   Neurological:  Positive for headaches. Negative for dizziness and focal weakness.   Psychiatric/Behavioral:  Negative for depression. The patient is not nervous/anxious.        OBJECTIVE:  Vital signs  Vitals:    05/08/23 0925   BP: 109/71   Pulse: 60   Resp: 18   Temp: 98 °F (36.7 °C)   TempSrc: Oral   SpO2: 100%   Weight: 80.7 kg (178 lb)   Height: 5' 5" (1.651 m)      Body mass index is 29.62 kg/m².    PHYSICAL EXAM:   Physical Exam  Vitals reviewed.   Constitutional:       General: She is not in acute distress.     Appearance: Normal appearance. She is not ill-appearing.   HENT:      Head: Normocephalic and atraumatic.      Right Ear: External ear normal.      Left Ear: External ear normal.      Nose: Nose normal. No rhinorrhea.      Mouth/Throat:      Mouth: Mucous membranes are moist.   Eyes:      General: No scleral icterus.        Right eye: No discharge.         Left eye: No discharge.      Conjunctiva/sclera: Conjunctivae normal.      Pupils: Pupils are equal, round, and reactive to light.   Cardiovascular:      Rate and Rhythm: Normal rate and regular rhythm.      Heart sounds: No murmur heard.  Pulmonary:      Effort: Pulmonary effort is normal. No respiratory distress.      Breath sounds: No wheezing, rhonchi or rales.   Abdominal:      General: Bowel sounds are normal. There is no distension.      Palpations: Abdomen is soft.      Tenderness: There is no abdominal tenderness.   Musculoskeletal:         General: No tenderness. Normal range of motion.      Cervical back: Normal range of motion and neck supple. No rigidity or " tenderness.      Right lower leg: No edema.      Left lower leg: No edema.   Skin:     General: Skin is warm.      Coloration: Skin is not pale.      Findings: No rash.   Neurological:      General: No focal deficit present.      Mental Status: She is alert and oriented to person, place, and time.   Psychiatric:         Mood and Affect: Mood normal.         Behavior: Behavior normal.        ASSESSMENT/PLAN:  1. Restless leg syndrome  -     gabapentin (NEURONTIN) 100 MG capsule; Take 1 capsule (100 mg total) by mouth 3 (three) times daily.  Dispense: 90 capsule; Refill: 2  -     Ferritin; Future; Expected date: 05/08/2023    2. Pain in both lower extremities  -     CV Ultrasound doppler arterial legs bilat; Future    3. Other migraine without status migrainosus, not intractable  -     sumatriptan (IMITREX) 100 MG tablet; Take 1 tablet (100 mg total) by mouth as needed for Migraine. Can repeat in 2 hours. Max 2 tabs in 24 hours  Dispense: 10 tablet; Refill: 1    4. Elevated serum protein level  -     Protein Electrophoresis, Serum, w/Interp; Future; Expected date: 05/08/2023    5. Chronic midline low back pain without sciatica  -     X-Ray Lumbar Spine 5 View; Future; Expected date: 05/08/2023    6. Chronic constipation  -     Ambulatory referral/consult to Gastroenterology; Future; Expected date: 05/15/2023  -     polyethylene glycol (GLYCOLAX) 17 gram/dose powder; Take 17 g by mouth once daily.  Dispense: 850 g; Refill: 0    7. Need for shingles vaccine  -     varicella-zoster [id number] (PF)(SHINGRIX) 50 mcg/0.5 mL injection    Plan  - constant feeling of moving legs at night, likely RLS, check ferritin. Trial of gabapentin  Check for PAD, given the pain with walking.  -migraines not happening too often. Declines preventative treatment. Refill Imitrex  -check XR of low back. No red flags. But since protein level high, check serum protein electrophoresis  - glycolax for constipation. Referred to GI. Used to be on  linzess.   - shingles vaccine given         Previous medical history/lab work/radiology reviewed and considered during medical management decisions.   Medication list reviewed and medication reconciliation performed.  Patient was provided  and care about his/her current diagnosis (es) and medications including risk/benefit and side effects/adverse events, over the counter medication uses/doses, home self-care and contact precautions,  and red flags and indications for when to seek immediate medical attention.   Patient was advised to continue compliance with current medication list and medical recommendations.  Recommended/ Advised continued compliance with recommended eating habits/ diets for medical conditions and exercise 150 minutes/ week (if possible) for medical condition (s).        RESULTS:  Recent Results (from the past 1008 hour(s))   Ferritin    Collection Time: 05/08/23 10:53 AM   Result Value Ref Range    Ferritin Level 137.36 4.63 - 204.00 ng/mL         Follow Up:  Follow up in about 3 months (around 8/8/2023) for leg pain, extended visit needs .     [unfilled]    This note was created with the assistance of a voice recognition software or phone dictation. There may be transcription errors as a result of using this technology however minimal. Effort has been made to assure accuracy of transcription but any obvious errors or omissions should be clarified with the author of the document

## 2023-05-09 ENCOUNTER — TELEPHONE (OUTPATIENT)
Dept: FAMILY MEDICINE | Facility: CLINIC | Age: 63
End: 2023-05-09
Payer: MEDICAID

## 2023-05-09 NOTE — TELEPHONE ENCOUNTER
Called patient via phone call and patient understands results given. No further questions at this time.     \----- Message from Kandy Castillo MD sent at 5/9/2023  2:00 PM CDT -----  Normal ferritin which is iron deposition in body. Also the specific test for the protein was also normal.  Thanks

## 2023-05-09 NOTE — TELEPHONE ENCOUNTER
LVM via     ----- Message from Kandy Castillo MD sent at 5/9/2023  2:00 PM CDT -----  Normal ferritin which is iron deposition in body. Also the specific test for the protein was also normal.  Thanks

## 2023-05-10 ENCOUNTER — TELEPHONE (OUTPATIENT)
Dept: FAMILY MEDICINE | Facility: CLINIC | Age: 63
End: 2023-05-10
Payer: MEDICAID

## 2023-05-15 ENCOUNTER — HOSPITAL ENCOUNTER (OUTPATIENT)
Dept: RADIOLOGY | Facility: HOSPITAL | Age: 63
Discharge: HOME | End: 2023-05-15
Attending: FAMILY MEDICINE
Payer: MEDICAID

## 2023-05-15 DIAGNOSIS — M54.50 CHRONIC MIDLINE LOW BACK PAIN WITHOUT SCIATICA: ICD-10-CM

## 2023-05-15 DIAGNOSIS — M79.604 PAIN IN BOTH LOWER EXTREMITIES: ICD-10-CM

## 2023-05-15 DIAGNOSIS — G89.29 CHRONIC LEFT SHOULDER PAIN: ICD-10-CM

## 2023-05-15 DIAGNOSIS — M54.2 NECK PAIN: ICD-10-CM

## 2023-05-15 DIAGNOSIS — G89.29 CHRONIC MIDLINE LOW BACK PAIN WITHOUT SCIATICA: ICD-10-CM

## 2023-05-15 DIAGNOSIS — M25.512 CHRONIC LEFT SHOULDER PAIN: ICD-10-CM

## 2023-05-15 DIAGNOSIS — M79.605 PAIN IN BOTH LOWER EXTREMITIES: ICD-10-CM

## 2023-05-15 PROCEDURE — 72040 X-RAY EXAM NECK SPINE 2-3 VW: CPT | Mod: TC

## 2023-05-15 PROCEDURE — 93922 UPR/L XTREMITY ART 2 LEVELS: CPT

## 2023-05-15 PROCEDURE — 72110 X-RAY EXAM L-2 SPINE 4/>VWS: CPT | Mod: TC

## 2023-05-15 PROCEDURE — 73030 X-RAY EXAM OF SHOULDER: CPT | Mod: TC,LT

## 2023-05-20 LAB
LEFT ABI: 1.17
LEFT ARM BP: 105 MMHG
LEFT DORSALIS PEDIS: 126 MMHG
LEFT POSTERIOR TIBIAL: 129 MMHG
LEFT TBI: 1.03
LEFT TOE PRESSURE: 113 MMHG
RIGHT ABI: 1.15
RIGHT ARM BP: 110 MMHG
RIGHT DORSALIS PEDIS: 124 MMHG
RIGHT POSTERIOR TIBIAL: 126 MMHG
RIGHT TBI: 1
RIGHT TOE PRESSURE: 110 MMHG

## 2023-06-16 ENCOUNTER — TELEPHONE (OUTPATIENT)
Dept: FAMILY MEDICINE | Facility: CLINIC | Age: 63
End: 2023-06-16
Payer: MEDICAID

## 2023-06-16 NOTE — TELEPHONE ENCOUNTER
LVM via     ----- Message from Kandy Castillo MD sent at 6/16/2023 11:25 AM CDT -----  Please let patient know that her x-rays showed degenerative changes.  She was referred to orthopedics and had an appointment with them but did not show up.  If she prefers to follow with them, she needs to call them and reschedule.  Thanks

## 2023-06-20 ENCOUNTER — TELEPHONE (OUTPATIENT)
Dept: FAMILY MEDICINE | Facility: CLINIC | Age: 63
End: 2023-06-20
Payer: MEDICAID

## 2023-06-20 NOTE — TELEPHONE ENCOUNTER
LVM via .    ----- Message from Kandy Castillo MD sent at 6/16/2023 11:25 AM CDT -----  Please let patient know that her x-rays showed degenerative changes.  She was referred to orthopedics and had an appointment with them but did not show up.  If she prefers to follow with them, she needs to call them and reschedule.  Thanks

## 2023-07-12 ENCOUNTER — OFFICE VISIT (OUTPATIENT)
Dept: GASTROENTEROLOGY | Facility: CLINIC | Age: 63
End: 2023-07-12
Payer: MEDICAID

## 2023-07-12 VITALS
OXYGEN SATURATION: 97 % | SYSTOLIC BLOOD PRESSURE: 108 MMHG | HEART RATE: 60 BPM | HEIGHT: 65 IN | BODY MASS INDEX: 29.46 KG/M2 | DIASTOLIC BLOOD PRESSURE: 72 MMHG | RESPIRATION RATE: 16 BRPM | WEIGHT: 176.81 LBS | TEMPERATURE: 98 F

## 2023-07-12 DIAGNOSIS — K59.09 CHRONIC CONSTIPATION: Primary | ICD-10-CM

## 2023-07-12 DIAGNOSIS — K92.1 HEMATOCHEZIA: ICD-10-CM

## 2023-07-12 PROCEDURE — 1160F RVW MEDS BY RX/DR IN RCRD: CPT | Mod: CPTII,,, | Performed by: NURSE PRACTITIONER

## 2023-07-12 PROCEDURE — 3008F PR BODY MASS INDEX (BMI) DOCUMENTED: ICD-10-PCS | Mod: CPTII,,, | Performed by: NURSE PRACTITIONER

## 2023-07-12 PROCEDURE — 99214 PR OFFICE/OUTPT VISIT, EST, LEVL IV, 30-39 MIN: ICD-10-PCS | Mod: S$PBB,,, | Performed by: NURSE PRACTITIONER

## 2023-07-12 PROCEDURE — 3044F PR MOST RECENT HEMOGLOBIN A1C LEVEL <7.0%: ICD-10-PCS | Mod: CPTII,,, | Performed by: NURSE PRACTITIONER

## 2023-07-12 PROCEDURE — 3078F DIAST BP <80 MM HG: CPT | Mod: CPTII,,, | Performed by: NURSE PRACTITIONER

## 2023-07-12 PROCEDURE — 99215 OFFICE O/P EST HI 40 MIN: CPT | Mod: PBBFAC | Performed by: NURSE PRACTITIONER

## 2023-07-12 PROCEDURE — 3074F SYST BP LT 130 MM HG: CPT | Mod: CPTII,,, | Performed by: NURSE PRACTITIONER

## 2023-07-12 PROCEDURE — 1159F PR MEDICATION LIST DOCUMENTED IN MEDICAL RECORD: ICD-10-PCS | Mod: CPTII,,, | Performed by: NURSE PRACTITIONER

## 2023-07-12 PROCEDURE — 99214 OFFICE O/P EST MOD 30 MIN: CPT | Mod: S$PBB,,, | Performed by: NURSE PRACTITIONER

## 2023-07-12 PROCEDURE — 3044F HG A1C LEVEL LT 7.0%: CPT | Mod: CPTII,,, | Performed by: NURSE PRACTITIONER

## 2023-07-12 PROCEDURE — 3008F BODY MASS INDEX DOCD: CPT | Mod: CPTII,,, | Performed by: NURSE PRACTITIONER

## 2023-07-12 PROCEDURE — 3074F PR MOST RECENT SYSTOLIC BLOOD PRESSURE < 130 MM HG: ICD-10-PCS | Mod: CPTII,,, | Performed by: NURSE PRACTITIONER

## 2023-07-12 PROCEDURE — 1159F MED LIST DOCD IN RCRD: CPT | Mod: CPTII,,, | Performed by: NURSE PRACTITIONER

## 2023-07-12 PROCEDURE — 3078F PR MOST RECENT DIASTOLIC BLOOD PRESSURE < 80 MM HG: ICD-10-PCS | Mod: CPTII,,, | Performed by: NURSE PRACTITIONER

## 2023-07-12 PROCEDURE — 1160F PR REVIEW ALL MEDS BY PRESCRIBER/CLIN PHARMACIST DOCUMENTED: ICD-10-PCS | Mod: CPTII,,, | Performed by: NURSE PRACTITIONER

## 2023-07-12 RX ORDER — POLYETHYLENE GLYCOL 3350, SODIUM SULFATE, SODIUM CHLORIDE, POTASSIUM CHLORIDE, SODIUM ASCORBATE, AND ASCORBIC ACID 7.5-2.691G
2000 KIT ORAL ONCE
Qty: 1 KIT | Refills: 0 | Status: SHIPPED | OUTPATIENT
Start: 2023-07-12 | End: 2023-07-12

## 2023-07-12 NOTE — ASSESSMENT & PLAN NOTE
Received Choice form at [phone]  Agency/Facility Name: 1)[facility], 2)[facility], 3)[facility]  Referral sent per Choice form @ [phone]      See above

## 2023-07-12 NOTE — PROGRESS NOTES
Subjective:       Patient ID: Leonora Barnes is a 63 y.o. female.    Chief Complaint: Constipation (3-4 stools weekly)    This 63-year-old  female is referred for chronic constipation.  She presents unaccompanied.   Kristine [id number] used throughout patient visit.  She reports a longstanding history of chronic constipation that has worsened over time.  She has one bowel movement every 2-3 days.  She doesn't always feel completely evacuated.  She reports frequent straining when trying to have a bowel movement and will have to sit for an extended period of time.  She has occasional red blood with bowel movements noted on the tissue after wiping that is infrequent and which she associates with hemorrhoidal disease.  She denies melena, fecal urgency, fecal incontinence, or pain with defecation.  Her appetite is good and her weight is stable.  She denies nocturnal symptoms, fever, chills, nausea, vomiting, hematemesis, odynophagia, dysphagia, acid reflux, pyrosis, or early satiety.  She will experience occasional abdominal pain with constipation that is mostly relieved with defecation.  She takes MiraLAX 17 g daily with minimal relief noted.  She denies taking anything else for treatment of constipation.    She had an EGD done approximately 25 years ago with findings of gastric ulcers. She had a colonoscopy done approximately 8 years ago in [place] which was unremarkable. She denies regular NSAID use or use of blood thinners. She denies tobacco or alcohol use. She denies a family history of IBD, colon polyps, or colon cancer.    Review of patient's allergies indicates:  No Known Allergies    Past Medical History:   Diagnosis Date    Chronic constipation     RLS (restless legs syndrome)      Past Surgical History:   Procedure Laterality Date    CARDIAC SURGERY       SECTION       Family History:   family history includes Asthma in her mother; Hypertension in her father.    Social History:     reports that she has never smoked. She has never used smokeless tobacco. She reports that she does not drink alcohol and does not use drugs.    Review of Systems  Negative except as noted in the HPI.      Objective:      Physical Exam  Constitutional:       Appearance: Normal appearance.   HENT:      Head: Normocephalic.      Mouth/Throat:      Mouth: Mucous membranes are moist.   Eyes:      Extraocular Movements: Extraocular movements intact.      Conjunctiva/sclera: Conjunctivae normal.      Pupils: Pupils are equal, round, and reactive to light.   Cardiovascular:      Rate and Rhythm: Normal rate and regular rhythm.      Pulses: Normal pulses.      Heart sounds: Normal heart sounds.   Pulmonary:      Effort: Pulmonary effort is normal.      Breath sounds: Normal breath sounds.   Abdominal:      General: Bowel sounds are normal.      Palpations: Abdomen is soft.      Comments: Mild lower abdominal tenderness noted with deep palpation, no rebound or guarding   Musculoskeletal:         General: Normal range of motion.      Cervical back: Normal range of motion and neck supple.   Skin:     General: Skin is warm and dry.   Neurological:      General: No focal deficit present.      Mental Status: She is alert and oriented to person, place, and time.   Psychiatric:         Mood and Affect: Mood normal.         Behavior: Behavior normal.         Thought Content: Thought content normal.         Judgment: Judgment normal.       Home Medications:     Current Outpatient Medications   Medication Sig    atorvastatin (LIPITOR) 20 MG tablet Take 1 tablet (20 mg total) by mouth once daily.    gabapentin (NEURONTIN) 100 MG capsule Take 1 capsule (100 mg total) by mouth 3 (three) times daily.    pantoprazole (PROTONIX) 40 MG tablet Take 1 tablet (40 mg total) by mouth once daily.    polyethylene glycol (GLYCOLAX) 17 gram/dose powder Take 17 g by mouth once daily.    sumatriptan (IMITREX) 100 MG tablet Take 1 tablet (100 mg  total) by mouth as needed for Migraine. Can repeat in 2 hours. Max 2 tabs in 24 hours    atenoloL (TENORMIN) 25 MG tablet Take 25 mg by mouth once daily at 6am.    docusate sodium (COLACE) 100 MG capsule Take 1 capsule (100 mg total) by mouth daily as needed for Constipation. (Patient not taking: Reported on 2/7/2023)    linaCLOtide (LINZESS) 145 mcg Cap capsule Take 145 mcg by mouth once daily at 6am.    magnesium citrate solution Take 150 mLs by mouth 2 (two) times a day.    naproxen (NAPROSYN) 500 MG tablet TAKE 1 TABLET(500 MG) BY MOUTH TWICE DAILY WITH MEALS (Patient not taking: Reported on 7/12/2023)    nitroGLYCERIN (NITROSTAT) 0.4 MG SL tablet Place 0.4 mg under the tongue every 5 (five) minutes as needed.     Laboratory Results:     Recent Results (from the past 4032 hour(s))   Comprehensive Metabolic Panel    Collection Time: 02/22/23  8:41 AM   Result Value Ref Range    Sodium Level 141 136 - 145 mmol/L    Potassium Level 4.7 3.5 - 5.1 mmol/L    Chloride 107 98 - 107 mmol/L    Carbon Dioxide 29 23 - 31 mmol/L    Glucose Level 90 82 - 115 mg/dL    Blood Urea Nitrogen 23.3 (H) 9.8 - 20.1 mg/dL    Creatinine 0.88 0.55 - 1.02 mg/dL    Calcium Level Total 10.1 8.4 - 10.2 mg/dL    Protein Total 8.2 (H) 5.8 - 7.6 gm/dL    Albumin Level 4.2 3.4 - 4.8 g/dL    Globulin 4.0 (H) 2.4 - 3.5 gm/dL    Albumin/Globulin Ratio 1.1 1.1 - 2.0 ratio    Bilirubin Total 0.8 <=1.5 mg/dL    Alkaline Phosphatase 101 40 - 150 unit/L    Alanine Aminotransferase 23 0 - 55 unit/L    Aspartate Aminotransferase 23 5 - 34 unit/L    eGFR >60 mls/min/1.73/m2   Lipid Panel    Collection Time: 02/22/23  8:41 AM   Result Value Ref Range    Cholesterol Total 229 (H) <=200 mg/dL    HDL Cholesterol 88 (H) 35 - 60 mg/dL    Triglyceride 48 37 - 140 mg/dL    Cholesterol/HDL Ratio 3 0 - 5    Very Low Density Lipoprotein 10     LDL Cholesterol 131.00 50.00 - 140.00 mg/dL   Hemoglobin A1C    Collection Time: 02/22/23  8:41 AM   Result Value Ref Range     Hemoglobin A1c 5.4 <=7.0 %    Estimated Average Glucose 108.3 mg/dL   CBC with Differential    Collection Time: 02/22/23  8:41 AM   Result Value Ref Range    WBC 5.7 4.5 - 11.5 x10(3)/mcL    RBC 4.64 4.20 - 5.40 x10(6)/mcL    Hgb 13.7 12.0 - 16.0 g/dL    Hct 41.2 37.0 - 47.0 %    MCV 88.8 80.0 - 94.0 fL    MCH 29.5 pg    MCHC 33.3 33.0 - 36.0 g/dL    RDW 13.7 11.5 - 17.0 %    Platelet 157 130 - 400 x10(3)/mcL    MPV 11.0 (H) 7.4 - 10.4 fL    Neut % 51.1 %    Lymph % 38.9 %    Mono % 7.9 %    Eos % 1.4 %    Basophil % 0.5 %    Lymph # 2.22 0.6 - 4.6 x10(3)/mcL    Neut # 2.91 2.1 - 9.2 x10(3)/mcL    Mono # 0.45 0.1 - 1.3 x10(3)/mcL    Eos # 0.08 0 - 0.9 x10(3)/mcL    Baso # 0.03 0 - 0.2 x10(3)/mcL    IG# 0.01 0 - 0.04 x10(3)/mcL    IG% 0.2 %    NRBC% 0.0 %   Cologuard Screening (Multitarget Stool DNA)    Collection Time: 03/15/23  9:42 AM    Specimen: Rectum; Stool   Result Value Ref Range    Cologuard Result Negative Negative   Protein Electrophoresis, Serum, w/Interp    Collection Time: 05/08/23 10:53 AM   Result Value Ref Range    Protein Total 8.2 (H) 5.8 - 7.6 gm/dL    Albumin 4.0 g/dL    Globulin 4.2 gm/dL    Albumin/Globulin Ratio 1.0 ratio    Albumin, SPEP 48.18 48.1 - 59.5    Alpha 1 Glob % 3.65 2.3 - 4.9    Alpha 1 Glob 0.30 0.00 - 0.40 gm/dl    Alpha 2 Glob% 9.32 6.9 - 13    Alpha 2 Glob 0.76 0.40 - 1.00 gm/dl    Beta Glob % 17.50 13.8 - 19.7    Beta Glob 1.43 (H) 0.70 - 1.30 gm/dl    Gamma Globulin % 21.36 10.1 - 21.9    Gamma Globulin 1.75 0.40 - 1.80 gm/dl    M Champ %      M Champ     Ferritin    Collection Time: 05/08/23 10:53 AM   Result Value Ref Range    Ferritin Level 137.36 4.63 - 204.00 ng/mL   Pathologist Interpretation    Collection Time: 05/08/23 10:53 AM   Result Value Ref Range    Pathology Review       SPEP: No M-spike indicative of a monoclonal protein is identified.    Gonzalez Kimball M.D.    Ankle Brachial Indices (KATHY)    Collection Time: 05/15/23 10:29 AM   Result Value Ref Range     Left arm  mmHg    Right arm  mmHg    Left posterior tibial 129 mmHg    Right posterior tibial 126 mmHg    Left dorsalis pedis 126 mmHg    Right dorsalis pedis 124 mmHg    Left KATHY 1.17     Right KATHY 1.15     Left toe pressure 113 mmHg    Right toe pressure 110 mmHg    Left TBI 1.03     Right TBI 1.00      Imaging Results:     Narrative & Impression  EXAMINATION:  CT ABDOMEN PELVIS WITHOUT CONTRAST     CLINICAL HISTORY:  Abdominal pain, acute, nonlocalized;llq abd pain;     TECHNIQUE:  CT imaging was performed of the abdomen and pelvis without intravenous contrast. Dose length product is 365 mGycm. Automatic exposure control, adjustment of mA/kV or iterative reconstruction technique was used to limit radiation dose.     COMPARISON:  None     FINDINGS:  Assessment of the visceral organs and vasculature is limited by the lack of IV contrast.     Liver/biliary: No concerning hepatic findings. No radiodense gallstone or biliary dilatation appreciated.     Pancreas: Normal.     Spleen: Normal.     Adrenals: Normal.     Kidneys, ureters and bladder: There is no obstructing urinary calculus.  There is no hydronephrosis.  There is mild diffuse urinary bladder wall thickening     Reproductive organs: There is a 2.2 cm likely left ovarian cyst.     Stomach/bowel: There is a moderate colonic stool volume without obstruction.  Normal appendix. No discernible bowel inflammation.     Lymph nodes: No pathologically enlarged lymph node identified with noncontrast technique.     Peritoneum: No ascites or free air.     Vessels: Normal abdominal aortic caliber.     Abdominal wall: Normal.     Lung bases: Left basilar subsegmental atelectasis.     Bones: No acute osseous findings.     Impression:     1. No acute abnormality abdomen or pelvis.  2. Moderate colonic stool volume without obstruction.  3. Likely 2.2 cm left ovarian cyst.      Electronically signed by: Matilda Howard  Date:                                             05/26/2022  Time:                                           14:11    Assessment/Plan:     Problem List Items Addressed This Visit          GI    Chronic constipation - Primary     Recommend soluble fiber supplementation  Avoid straining or sitting on the toilet for long periods of time  Stop MiraLAX and start Linzess 145 mcg daily  Colonoscopy   Hemorrhoidal banding discussed   Call with updates  ER precautions provided         Relevant Medications    linaCLOtide (LINZESS) 145 mcg Cap capsule    Other Relevant Orders    Case Request Endoscopy: COLONOSCOPY (Completed)    Hematochezia     See above         Relevant Medications    linaCLOtide (LINZESS) 145 mcg Cap capsule    Other Relevant Orders    Case Request Endoscopy: COLONOSCOPY (Completed)

## 2023-07-12 NOTE — ASSESSMENT & PLAN NOTE
Recommend soluble fiber supplementation  Avoid straining or sitting on the toilet for long periods of time  Stop MiraLAX and start Linzess 145 mcg daily  Colonoscopy   Hemorrhoidal banding discussed   Call with updates  ER precautions provided

## 2023-07-29 DIAGNOSIS — G25.81 RESTLESS LEG SYNDROME: ICD-10-CM

## 2023-07-31 RX ORDER — GABAPENTIN 100 MG/1
100 CAPSULE ORAL 3 TIMES DAILY
Qty: 90 CAPSULE | Refills: 0 | Status: SHIPPED | OUTPATIENT
Start: 2023-07-31 | End: 2023-08-09 | Stop reason: SDUPTHER

## 2023-08-09 ENCOUNTER — OFFICE VISIT (OUTPATIENT)
Dept: FAMILY MEDICINE | Facility: CLINIC | Age: 63
End: 2023-08-09
Payer: MEDICAID

## 2023-08-09 VITALS
HEART RATE: 60 BPM | RESPIRATION RATE: 16 BRPM | BODY MASS INDEX: 29.66 KG/M2 | TEMPERATURE: 97 F | HEIGHT: 65 IN | OXYGEN SATURATION: 100 % | SYSTOLIC BLOOD PRESSURE: 126 MMHG | DIASTOLIC BLOOD PRESSURE: 88 MMHG | WEIGHT: 178 LBS

## 2023-08-09 DIAGNOSIS — G25.81 RESTLESS LEG SYNDROME: Primary | ICD-10-CM

## 2023-08-09 DIAGNOSIS — G43.809 OTHER MIGRAINE WITHOUT STATUS MIGRAINOSUS, NOT INTRACTABLE: ICD-10-CM

## 2023-08-09 DIAGNOSIS — I47.10 SVT (SUPRAVENTRICULAR TACHYCARDIA): ICD-10-CM

## 2023-08-09 DIAGNOSIS — Z23 IMMUNIZATION DUE: ICD-10-CM

## 2023-08-09 DIAGNOSIS — E78.5 HYPERLIPIDEMIA, UNSPECIFIED HYPERLIPIDEMIA TYPE: ICD-10-CM

## 2023-08-09 DIAGNOSIS — Z11.59 ENCOUNTER FOR HEPATITIS C SCREENING TEST FOR LOW RISK PATIENT: ICD-10-CM

## 2023-08-09 PROBLEM — G43.909 MIGRAINE WITHOUT STATUS MIGRAINOSUS, NOT INTRACTABLE: Status: ACTIVE | Noted: 2023-08-09

## 2023-08-09 PROCEDURE — 90715 TDAP VACCINE 7 YRS/> IM: CPT | Mod: PBBFAC

## 2023-08-09 PROCEDURE — 3074F SYST BP LT 130 MM HG: CPT | Mod: CPTII,,, | Performed by: FAMILY MEDICINE

## 2023-08-09 PROCEDURE — 3044F HG A1C LEVEL LT 7.0%: CPT | Mod: CPTII,,, | Performed by: FAMILY MEDICINE

## 2023-08-09 PROCEDURE — 1159F PR MEDICATION LIST DOCUMENTED IN MEDICAL RECORD: ICD-10-PCS | Mod: CPTII,,, | Performed by: FAMILY MEDICINE

## 2023-08-09 PROCEDURE — 90471 IMMUNIZATION ADMIN: CPT | Mod: PBBFAC

## 2023-08-09 PROCEDURE — 99213 OFFICE O/P EST LOW 20 MIN: CPT | Mod: PBBFAC | Performed by: FAMILY MEDICINE

## 2023-08-09 PROCEDURE — 3079F DIAST BP 80-89 MM HG: CPT | Mod: CPTII,,, | Performed by: FAMILY MEDICINE

## 2023-08-09 PROCEDURE — 3008F BODY MASS INDEX DOCD: CPT | Mod: CPTII,,, | Performed by: FAMILY MEDICINE

## 2023-08-09 PROCEDURE — 3008F PR BODY MASS INDEX (BMI) DOCUMENTED: ICD-10-PCS | Mod: CPTII,,, | Performed by: FAMILY MEDICINE

## 2023-08-09 PROCEDURE — 99214 OFFICE O/P EST MOD 30 MIN: CPT | Mod: S$PBB,,, | Performed by: FAMILY MEDICINE

## 2023-08-09 PROCEDURE — 1159F MED LIST DOCD IN RCRD: CPT | Mod: CPTII,,, | Performed by: FAMILY MEDICINE

## 2023-08-09 PROCEDURE — 3074F PR MOST RECENT SYSTOLIC BLOOD PRESSURE < 130 MM HG: ICD-10-PCS | Mod: CPTII,,, | Performed by: FAMILY MEDICINE

## 2023-08-09 PROCEDURE — 3079F PR MOST RECENT DIASTOLIC BLOOD PRESSURE 80-89 MM HG: ICD-10-PCS | Mod: CPTII,,, | Performed by: FAMILY MEDICINE

## 2023-08-09 PROCEDURE — 99214 PR OFFICE/OUTPT VISIT, EST, LEVL IV, 30-39 MIN: ICD-10-PCS | Mod: S$PBB,,, | Performed by: FAMILY MEDICINE

## 2023-08-09 PROCEDURE — 3044F PR MOST RECENT HEMOGLOBIN A1C LEVEL <7.0%: ICD-10-PCS | Mod: CPTII,,, | Performed by: FAMILY MEDICINE

## 2023-08-09 RX ORDER — SUMATRIPTAN SUCCINATE 100 MG/1
100 TABLET ORAL
Qty: 10 TABLET | Refills: 2 | Status: SHIPPED | OUTPATIENT
Start: 2023-08-09 | End: 2023-10-27 | Stop reason: SDUPTHER

## 2023-08-09 RX ORDER — GABAPENTIN 300 MG/1
300 CAPSULE ORAL NIGHTLY
Qty: 30 CAPSULE | Refills: 2 | Status: SHIPPED | OUTPATIENT
Start: 2023-08-09 | End: 2023-11-29

## 2023-08-09 RX ORDER — ATENOLOL 25 MG/1
25 TABLET ORAL DAILY
Qty: 30 TABLET | Refills: 2 | Status: SHIPPED | OUTPATIENT
Start: 2023-08-09

## 2023-08-09 RX ADMIN — TETANUS TOXOID, REDUCED DIPHTHERIA TOXOID AND ACELLULAR PERTUSSIS VACCINE, ADSORBED 0.5 ML: 5; 2.5; 8; 8; 2.5 SUSPENSION INTRAMUSCULAR at 10:08

## 2023-08-09 NOTE — PROGRESS NOTES
"Patient Name: Leonora Barnes   : 1960  MRN: [id number]     SUBJECTIVE:  Leonora Barnes is a 63 y.o. female here for Follow-up (The patient states that the medication is helping a little. )  .    HPI  Here for routine follow up.   used throughout this encounter.  Last visit resumed pt on gabapoentin. Used to bne on it in the past for resteless leg syndrome. KATHY normal. Ferritin normal.  Mostly taking it bid instead of TID because it makes her drowsy. It does help a lot though.    Imitrex 100 mg for migraine. Uses it every week, once or max twice. Resolves the pain, rarely using it twice in 24 hrs and aware that should not take more than 2 tabs in 24 hours.   On atenolol for SVT follows with cardiplogy. No HTN issues.    ALLERGIES: Review of patient's allergies indicates:  No Known Allergies      ROS:  Review of Systems   Constitutional:  Negative for chills, fever and weight loss.   HENT:  Negative for congestion.    Eyes:  Negative for blurred vision.   Respiratory:  Negative for cough and shortness of breath.    Cardiovascular:  Negative for chest pain, palpitations and leg swelling.   Gastrointestinal:  Negative for abdominal pain, blood in stool, diarrhea, nausea and vomiting.   Genitourinary:  Negative for dysuria and hematuria.   Neurological:  Negative for dizziness and headaches.   Psychiatric/Behavioral:  Negative for depression. The patient is not nervous/anxious.          OBJECTIVE:  Vital signs  Vitals:    23 1029   BP: 126/88   Pulse: 60   Resp: 16   Temp: 97.4 °F (36.3 °C)   TempSrc: Oral   SpO2: 100%   Weight: 80.7 kg (178 lb)   Height: 5' 5" (1.651 m)      Body mass index is 29.62 kg/m².    PHYSICAL EXAM:   Physical Exam  Vitals reviewed.   Constitutional:       General: She is not in acute distress.     Appearance: Normal appearance. She is not ill-appearing.   HENT:      Head: Normocephalic and atraumatic.      Right Ear: External ear normal.      " Left Ear: External ear normal.      Nose: Nose normal. No rhinorrhea.      Mouth/Throat:      Mouth: Mucous membranes are moist.   Eyes:      General: No scleral icterus.        Right eye: No discharge.         Left eye: No discharge.      Conjunctiva/sclera: Conjunctivae normal.      Pupils: Pupils are equal, round, and reactive to light.   Cardiovascular:      Rate and Rhythm: Normal rate and regular rhythm.   Pulmonary:      Effort: Pulmonary effort is normal. No respiratory distress.      Breath sounds: No wheezing, rhonchi or rales.   Abdominal:      General: Bowel sounds are normal. There is no distension.      Palpations: Abdomen is soft.      Tenderness: There is no abdominal tenderness.   Musculoskeletal:      Cervical back: Normal range of motion and neck supple. No rigidity or tenderness.      Right lower leg: No edema.      Left lower leg: No edema.   Skin:     General: Skin is warm.      Coloration: Skin is not pale.      Findings: No rash.   Neurological:      General: No focal deficit present.      Mental Status: She is alert and oriented to person, place, and time.   Psychiatric:         Mood and Affect: Mood normal.         Behavior: Behavior normal.          ASSESSMENT/PLAN:  1. Restless leg syndrome  -     gabapentin (NEURONTIN) 300 MG capsule; Take 1 capsule (300 mg total) by mouth every evening.  Dispense: 30 capsule; Refill: 2    2. SVT (supraventricular tachycardia)  -     CBC Auto Differential; Future; Expected date: 11/09/2023  -     Comprehensive Metabolic Panel; Future; Expected date: 11/09/2023  -     atenoloL (TENORMIN) 25 MG tablet; Take 1 tablet (25 mg total) by mouth once daily.  Dispense: 30 tablet; Refill: 2    3. Other migraine without status migrainosus, not intractable  -     sumatriptan (IMITREX) 100 MG tablet; Take 1 tablet (100 mg total) by mouth as needed for Migraine. Can repeat in 2 hours. Max 2 tabs in 24 hours  Dispense: 10 tablet; Refill: 2    4. Hyperlipidemia,  unspecified hyperlipidemia type  -     Lipid Panel; Future; Expected date: 11/09/2023    5. Encounter for hepatitis C screening test for low risk patient  -     Hepatitis C Antibody; Future; Expected date: 11/09/2023    6. Immunization due  -     Tdap (BOOSTRIX) vaccine injection 0.5 mL       Plan  -improved but not at goal control of restless legs syndrome.  Advised patient to take 300 mg in total instead of 200 mg of gabapentin.  Will switch to 300 mg nightly to avoid being drowsy during the day.  Patient voiced understanding and agreeable.  -SVT well-controlled on atenolol 25 mg.  Continue with it.  Check basic labs.  Continue to follow with Cardiology as needed.  -well-controlled migraines.  Rarely having headaches.  Imitrex 100 mg resolves the pain.  Continue with this abortive treatment.  No need for prevention, because of not very frequent headaches at this time.  -recheck lipid panel before next appointment.  Continue with atorvastatin 20 mg daily.  -hepatitis-C screening.  Tetanus booster given.      Previous medical history/lab work/radiology reviewed and considered during medical management decisions.   Medication list reviewed and medication reconciliation performed.  Patient was provided  and care about his/her current diagnosis (es) and medications including risk/benefit and side effects/adverse events, over the counter medication uses/doses, home self-care and contact precautions,  and red flags and indications for when to seek immediate medical attention.   Patient was advised to continue compliance with current medication list and medical recommendations.  Recommended/ Advised continued compliance with recommended eating habits/ diets for medical conditions and exercise 150 minutes/ week (if possible) for medical condition (s).        RESULTS:  No results found for this or any previous visit (from the past 1008 hour(s)).      Follow Up:  Follow up in about 3 months (around 11/9/2023) for RLS,  migraine. extended visit, needs .     [unfilled]    This note was created with the assistance of a voice recognition software or phone dictation. There may be transcription errors as a result of using this technology however minimal. Effort has been made to assure accuracy of transcription but any obvious errors or omissions should be clarified with the author of the document

## 2023-10-01 DIAGNOSIS — K21.9 GASTROESOPHAGEAL REFLUX DISEASE, UNSPECIFIED WHETHER ESOPHAGITIS PRESENT: ICD-10-CM

## 2023-10-02 RX ORDER — PANTOPRAZOLE SODIUM 40 MG/1
40 TABLET, DELAYED RELEASE ORAL
Qty: 90 TABLET | Refills: 1 | Status: SHIPPED | OUTPATIENT
Start: 2023-10-02 | End: 2023-10-27 | Stop reason: SDUPTHER

## 2023-10-27 DIAGNOSIS — K21.9 GASTROESOPHAGEAL REFLUX DISEASE, UNSPECIFIED WHETHER ESOPHAGITIS PRESENT: ICD-10-CM

## 2023-10-27 DIAGNOSIS — G43.809 OTHER MIGRAINE WITHOUT STATUS MIGRAINOSUS, NOT INTRACTABLE: ICD-10-CM

## 2023-10-30 RX ORDER — SUMATRIPTAN SUCCINATE 100 MG/1
100 TABLET ORAL
Qty: 10 TABLET | Refills: 2 | Status: SHIPPED | OUTPATIENT
Start: 2023-10-30 | End: 2024-02-01 | Stop reason: SDUPTHER

## 2023-10-30 RX ORDER — PANTOPRAZOLE SODIUM 40 MG/1
40 TABLET, DELAYED RELEASE ORAL DAILY
Qty: 90 TABLET | Refills: 1 | Status: SHIPPED | OUTPATIENT
Start: 2023-10-30

## 2023-11-03 DIAGNOSIS — E78.5 HYPERLIPIDEMIA, UNSPECIFIED HYPERLIPIDEMIA TYPE: Primary | ICD-10-CM

## 2023-11-03 RX ORDER — ROSUVASTATIN CALCIUM 5 MG/1
5 TABLET, COATED ORAL DAILY
Qty: 90 TABLET | Refills: 0 | Status: SHIPPED | OUTPATIENT
Start: 2023-11-03

## 2023-11-29 DIAGNOSIS — G25.81 RESTLESS LEG SYNDROME: ICD-10-CM

## 2023-11-29 RX ORDER — GABAPENTIN 300 MG/1
300 CAPSULE ORAL NIGHTLY
Qty: 30 CAPSULE | Refills: 2 | Status: SHIPPED | OUTPATIENT
Start: 2023-11-29 | End: 2024-02-01 | Stop reason: SDUPTHER

## 2024-01-05 ENCOUNTER — TELEPHONE (OUTPATIENT)
Dept: ENDOSCOPY | Facility: HOSPITAL | Age: 64
End: 2024-01-05
Payer: MEDICAID

## 2024-01-05 NOTE — TELEPHONE ENCOUNTER
"Attempted to contact patient via [employer] rep: Mr. Ibanez ID #[id number], to confirmed February 07/2024 colonoscopy procedure. Phone message states, "Number not reachable." MS  "

## 2024-02-01 DIAGNOSIS — G43.809 OTHER MIGRAINE WITHOUT STATUS MIGRAINOSUS, NOT INTRACTABLE: ICD-10-CM

## 2024-02-01 DIAGNOSIS — G25.81 RESTLESS LEG SYNDROME: ICD-10-CM

## 2024-02-02 RX ORDER — SUMATRIPTAN SUCCINATE 100 MG/1
100 TABLET ORAL
Qty: 10 TABLET | Refills: 0 | Status: SHIPPED | OUTPATIENT
Start: 2024-02-02 | End: 2024-03-05 | Stop reason: SDUPTHER

## 2024-02-02 RX ORDER — GABAPENTIN 300 MG/1
300 CAPSULE ORAL NIGHTLY
Qty: 30 CAPSULE | Refills: 0 | Status: SHIPPED | OUTPATIENT
Start: 2024-02-02 | End: 2024-03-05 | Stop reason: SDUPTHER

## 2024-03-05 DIAGNOSIS — G25.81 RESTLESS LEG SYNDROME: ICD-10-CM

## 2024-03-05 DIAGNOSIS — G43.809 OTHER MIGRAINE WITHOUT STATUS MIGRAINOSUS, NOT INTRACTABLE: ICD-10-CM

## 2024-03-07 RX ORDER — GABAPENTIN 300 MG/1
300 CAPSULE ORAL NIGHTLY
Qty: 30 CAPSULE | Refills: 1 | Status: SHIPPED | OUTPATIENT
Start: 2024-03-07 | End: 2024-06-07 | Stop reason: SDUPTHER

## 2024-03-07 RX ORDER — SUMATRIPTAN SUCCINATE 100 MG/1
100 TABLET ORAL
Qty: 10 TABLET | Refills: 0 | Status: SHIPPED | OUTPATIENT
Start: 2024-03-07 | End: 2024-04-15 | Stop reason: SDUPTHER

## 2024-04-15 DIAGNOSIS — G43.809 OTHER MIGRAINE WITHOUT STATUS MIGRAINOSUS, NOT INTRACTABLE: ICD-10-CM

## 2024-04-18 RX ORDER — SUMATRIPTAN SUCCINATE 100 MG/1
100 TABLET ORAL
Qty: 10 TABLET | Refills: 0 | Status: SHIPPED | OUTPATIENT
Start: 2024-04-18

## 2024-04-24 ENCOUNTER — HOSPITAL ENCOUNTER (EMERGENCY)
Facility: HOSPITAL | Age: 64
Discharge: HOME | End: 2024-04-24
Attending: INTERNAL MEDICINE
Payer: MEDICAID

## 2024-04-24 VITALS
TEMPERATURE: 97 F | RESPIRATION RATE: 18 BRPM | BODY MASS INDEX: 28.77 KG/M2 | HEART RATE: 66 BPM | SYSTOLIC BLOOD PRESSURE: 104 MMHG | HEIGHT: 66 IN | WEIGHT: 179 LBS | OXYGEN SATURATION: 99 % | DIASTOLIC BLOOD PRESSURE: 64 MMHG

## 2024-04-24 DIAGNOSIS — M54.41 RIGHT-SIDED LOW BACK PAIN WITH RIGHT-SIDED SCIATICA, UNSPECIFIED CHRONICITY: Primary | ICD-10-CM

## 2024-04-24 PROCEDURE — 63600175 PHARM REV CODE 636 W HCPCS: Performed by: NURSE PRACTITIONER

## 2024-04-24 PROCEDURE — 99284 EMERGENCY DEPT VISIT MOD MDM: CPT | Mod: 25

## 2024-04-24 PROCEDURE — 96372 THER/PROPH/DIAG INJ SC/IM: CPT | Performed by: NURSE PRACTITIONER

## 2024-04-24 RX ORDER — KETOROLAC TROMETHAMINE 30 MG/ML
30 INJECTION, SOLUTION INTRAMUSCULAR; INTRAVENOUS
Status: COMPLETED | OUTPATIENT
Start: 2024-04-24 | End: 2024-04-24

## 2024-04-24 RX ORDER — DICLOFENAC SODIUM 75 MG/1
75 TABLET, DELAYED RELEASE ORAL 2 TIMES DAILY
Qty: 14 TABLET | Refills: 0 | Status: SHIPPED | OUTPATIENT
Start: 2024-04-24 | End: 2024-05-01

## 2024-04-24 RX ADMIN — KETOROLAC TROMETHAMINE 30 MG: 30 INJECTION, SOLUTION INTRAMUSCULAR at 01:04

## 2024-04-24 NOTE — ED PROVIDER NOTES
Encounter Date: 2024       History     Chief Complaint   Patient presents with    Back Pain     Bilateral back pain that radiates down leg that started last night, denies NVD, denies dysuria, denies injury.      The history is provided by the patient. The history is limited by a language barrier. A  was used.   Back Pain   This is a new problem. The current episode started prior to awakening. The problem occurs constantly. The problem has been rapidly worsening. The pain is associated with no known injury. The pain is present in the sacro-iliac joint and gluteal region. The quality of the pain is described as stabbing, aching and shooting. The pain radiates to the right leg. The pain is at a severity of 10/10. The symptoms are aggravated by bending and twisting. The pain is The same all the time. Associated symptoms include leg pain. Pertinent negatives include no chest pain, no fever, no bladder incontinence, no dysuria, no pelvic pain, no paresthesias, no paresis, no tingling and no weakness. Treatments tried: gabapentin. The treatment provided no relief.     Review of patient's allergies indicates:  No Known Allergies  Past Medical History:   Diagnosis Date    Chronic constipation     RLS (restless legs syndrome)      Past Surgical History:   Procedure Laterality Date    CARDIAC SURGERY       SECTION       Family History   Problem Relation Name Age of Onset    Asthma Mother      Hypertension Father       Social History     Tobacco Use    Smoking status: Never    Smokeless tobacco: Never   Substance Use Topics    Alcohol use: Never    Drug use: Never     Review of Systems   Constitutional:  Negative for fever.   HENT:  Negative for sore throat.    Respiratory:  Negative for shortness of breath.    Cardiovascular:  Negative for chest pain.   Gastrointestinal:  Negative for nausea.   Genitourinary:  Negative for bladder incontinence, dysuria and pelvic pain.   Musculoskeletal:   Positive for back pain.   Skin:  Negative for rash.   Neurological:  Negative for tingling, weakness and paresthesias.   Hematological:  Does not bruise/bleed easily.       Physical Exam     Initial Vitals [04/24/24 1020]   BP Pulse Resp Temp SpO2   115/72 68 18 97.4 °F (36.3 °C) 100 %      MAP       --         Physical Exam    Nursing note and vitals reviewed.  Constitutional: She appears well-developed and well-nourished.   HENT:   Head: Normocephalic and atraumatic.   Eyes: Conjunctivae and EOM are normal. Pupils are equal, round, and reactive to light.   Neck: Neck supple.   Normal range of motion.  Cardiovascular:  Normal rate and regular rhythm.           Pulmonary/Chest: Breath sounds normal. No respiratory distress.   Abdominal: Abdomen is soft. Bowel sounds are normal. She exhibits no distension. There is no abdominal tenderness.   Musculoskeletal:         General: No edema. Normal range of motion.      Cervical back: Normal range of motion and neck supple.      Lumbar back: Normal.      Right hip: Tenderness present.      Left hip: Normal.      Comments: Pain to palpation over SI joint following along to the greater trochanter of the right hip.  Pain with external rotation of the hip worse than internal rotation.     Neurological: She is alert and oriented to person, place, and time. She has normal strength and normal reflexes. Tremors: Limping gait. No cranial nerve deficit or sensory deficit. Gait abnormal.   Skin: Skin is warm and dry.   Psychiatric: Thought content normal.         ED Course   Procedures  Labs Reviewed - No data to display       Imaging Results              X-Ray Hip 2 or 3 views Right (with Pelvis when performed) (Final result)  Result time 04/24/24 14:25:48      Final result by Facundo Wright MD (04/24/24 14:25:48)                   Impression:      No acute osseous findings      Electronically signed by: Facundo Wright MD  Date:    04/24/2024  Time:    14:25               Narrative:     EXAMINATION:  Three views pelvis and right hip    CLINICAL HISTORY:  Pain    COMPARISON:  None    FINDINGS:  No fracture, dislocation or osteonecrosis.                                       X-Ray Lumbar Spine Ap And Lateral (Final result)  Result time 04/24/24 13:49:17      Final result by Josie Duarte MD (04/24/24 13:49:17)                   Impression:      Mild degenerative change at the lower lumbar spine, similar to previous exam.      Electronically signed by: Josie Duarte  Date:    04/24/2024  Time:    13:49               Narrative:    EXAMINATION:  XR LUMBAR SPINE AP AND LATERAL    CLINICAL HISTORY:  pain;    TECHNIQUE:  3 views of the lumbar spine were performed.    COMPARISON:  05/15/2023    FINDINGS:  BONES: Vertebral body heights are maintained. Straightening of the usual spinal lordosis may be related to patient positioning or muscle spasm.    DISCS: Mild disc space narrowing at L4-L5 and L5-S1.    SOFT TISSUES: Regional soft tissues unremarkable.                                    X-Rays:   Independently Interpreted Readings:   Other Readings:  X-ray of the lumbar spine and right hip showed no evidence of fracture or acute pathology.  There is degenerative changes throughout the lumbar spine and the right hip.    Medications   ketorolac injection 30 mg (30 mg Intramuscular Given 4/24/24 1311)     Medical Decision Making  Amount and/or Complexity of Data Reviewed  External Data Reviewed: labs, radiology and notes.  Radiology: ordered and independent interpretation performed. Decision-making details documented in ED Course.     Details: X-ray of the lumbar spine and right hip showed no evidence of fracture or acute pathology.  There is degenerative changes throughout the lumbar spine and the right hip.    Risk  Prescription drug management.      Additional MDM:   Differential Diagnosis:   Sciatica, hip fracture, lumbar radiculopathy                              It is important that you  follow up with your primary care provider or specialist if indicated for further evaluation, workup, and treatment as necessary. The exam and treatment you received in Emergency Department was for an urgent problem and NOT INTENDED AS COMPLETE CARE. It is important that you FOLLOW UP with a doctor for ongoing care. If your symptoms become WORSE or you DO NOT IMPROVE and you are unable to reach your health care provider, you should RETURN to the Emergency Department        Clinical Impression:  Final diagnoses:  [M54.41] Right-sided low back pain with right-sided sciatica, unspecified chronicity (Primary)          ED Disposition Condition    Discharge Stable          ED Prescriptions       Medication Sig Dispense Start Date End Date Auth. Provider    diclofenac (VOLTAREN) 75 MG EC tablet Take 1 tablet (75 mg total) by mouth 2 (two) times daily. for 7 days 14 tablet 4/24/2024 5/1/2024 Shania Trammell, SUNI          Follow-up Information       Follow up With Specialties Details Why Contact Info    Kandy Castillo MD Family Medicine In 1 week As needed, If symptoms worsen [address] [facility] [address]  [phone]               Shania Trammell, SUNI  04/24/24 1326       Shania Trammell FNP  04/24/24 [id number]

## 2024-04-26 ENCOUNTER — OFFICE VISIT (OUTPATIENT)
Dept: CARDIOLOGY | Facility: CLINIC | Age: 64
End: 2024-04-26
Payer: MEDICAID

## 2024-04-26 VITALS
DIASTOLIC BLOOD PRESSURE: 71 MMHG | RESPIRATION RATE: 20 BRPM | HEIGHT: 66 IN | TEMPERATURE: 98 F | WEIGHT: 178 LBS | OXYGEN SATURATION: 100 % | SYSTOLIC BLOOD PRESSURE: 129 MMHG | HEART RATE: 63 BPM | BODY MASS INDEX: 28.61 KG/M2

## 2024-04-26 DIAGNOSIS — E78.5 HYPERLIPIDEMIA, UNSPECIFIED HYPERLIPIDEMIA TYPE: ICD-10-CM

## 2024-04-26 DIAGNOSIS — K21.9 GERD WITHOUT ESOPHAGITIS: ICD-10-CM

## 2024-04-26 DIAGNOSIS — R07.89 ATYPICAL CHEST PAIN: ICD-10-CM

## 2024-04-26 DIAGNOSIS — R01.1 UNDIAGNOSED CARDIAC MURMURS: ICD-10-CM

## 2024-04-26 DIAGNOSIS — K21.9 GASTROESOPHAGEAL REFLUX DISEASE, UNSPECIFIED WHETHER ESOPHAGITIS PRESENT: ICD-10-CM

## 2024-04-26 DIAGNOSIS — E78.2 MIXED HYPERLIPIDEMIA: Primary | ICD-10-CM

## 2024-04-26 DIAGNOSIS — I47.10 SVT (SUPRAVENTRICULAR TACHYCARDIA): ICD-10-CM

## 2024-04-26 PROCEDURE — 93005 ELECTROCARDIOGRAM TRACING: CPT

## 2024-04-26 PROCEDURE — 99214 OFFICE O/P EST MOD 30 MIN: CPT | Mod: PBBFAC,25 | Performed by: INTERNAL MEDICINE

## 2024-04-26 RX ORDER — ROSUVASTATIN CALCIUM 5 MG/1
5 TABLET, COATED ORAL DAILY
Qty: 90 TABLET | Refills: 3 | Status: SHIPPED | OUTPATIENT
Start: 2024-04-26 | End: 2025-04-26

## 2024-04-26 RX ORDER — NITROGLYCERIN 0.4 MG/1
0.4 TABLET SUBLINGUAL EVERY 5 MIN PRN
Qty: 25 TABLET | Refills: 3 | Status: SHIPPED | OUTPATIENT
Start: 2024-04-26 | End: 2025-04-26

## 2024-04-26 RX ORDER — PANTOPRAZOLE SODIUM 20 MG/1
20 TABLET, DELAYED RELEASE ORAL DAILY
Qty: 30 TABLET | Refills: 5 | Status: SHIPPED | OUTPATIENT
Start: 2024-04-26 | End: 2025-04-26

## 2024-04-26 NOTE — PROGRESS NOTES
"[facility]  Outpatient Cardiology Clinic    Chief Complaint: Palpitations and chest pain                                  HPI:  Leonora Barnes 64 y.o. female with PMHx of HLD, SVT on Holter 2020 and unspecified cardiac surgery in 2012 to repair "hole" presenting today for follow up. Normal LVEF in 2020 and LHC done as a result of abnormal nuclear scan w/o CAD.     Patient endorses "stabbing" chest pain for the last month which comes on randomly or with stress. It lasts a few minutes at a time and resolves on its own. She also endorses palpitations intermittently. Additionally, she endorses orthopnea for the last year and BLE edema. She uses 1-2 pillows to prop herself up so she can breathe when sleeping. Patient also endorses daytime sleepiness, frequent yawning, not feeling well rested upon awakening, and snoring. Otherwise, denies syncope, nausea, dizziness.                                                                                                                                                                                                                                                                                                                                                                                                                                                                              CARDIAC TESTING:  Results for orders placed during the hospital encounter of 09/12/22    Echo 9/12/22:    Interpretation Summary  · The left ventricle is normal in size with normal systolic function.  · The estimated ejection fraction is 70%.  · Normal left ventricular diastolic function.  · Normal right ventricular size with normal right ventricular systolic function.  · Mild right atrial enlargement.  · Mild pulmonic regurgitation.  · Mild mitral regurgitation.  · Mild tricuspid regurgitation.  · There is no pulmonary hypertension.  · The estimated PA systolic pressure is 36 mmHg.  · Normal central " "venous pressure (3 mmHg).    No results found for this or any previous visit.     No results found for this or any previous visit.       Patient Active Problem List   Diagnosis    Restless leg syndrome    Pain in both lower extremities    Chronic constipation    Hematochezia    SVT (supraventricular tachycardia)    Migraine without status migrainosus, not intractable    Hyperlipidemia     Past Surgical History:   Procedure Laterality Date    CARDIAC SURGERY       SECTION       Social History     Socioeconomic History    Marital status: Single   Tobacco Use    Smoking status: Never    Smokeless tobacco: Never   Substance and Sexual Activity    Alcohol use: Never    Drug use: Never    Sexual activity: Not Currently     Partners: Male        Family History   Problem Relation Name Age of Onset    Asthma Mother      Hypertension Father       Review of patient's allergies indicates:  No Known Allergies      ROS:                                                                                                                                                                             Negative except as stated in the history of present illness. See HPI for details.    PHYSICAL EXAM:  Visit Vitals  /71 (BP Location: Right arm, Patient Position: Sitting, BP Method: Medium (Automatic))   Pulse 63   Temp 98 °F (36.7 °C) (Oral)   Resp 20   Ht 5' 6" (1.676 m)   Wt 80.7 kg (178 lb)   SpO2 100%   BMI 28.73 kg/m²     Physical Exam  Constitutional:       Appearance: Normal appearance. She is obese.   HENT:      Head: Normocephalic.      Nose: Nose normal.   Eyes:      Extraocular Movements: Extraocular movements intact.      Conjunctiva/sclera: Conjunctivae normal.      Pupils: Pupils are equal, round, and reactive to light.   Neck:      Vascular: No carotid bruit.   Cardiovascular:      Rate and Rhythm: Normal rate and regular rhythm.      Pulses: Normal pulses.      Heart sounds: Murmur heard.      Comments: Diastolic " "murmur 3/6   Abdominal:      Palpations: Abdomen is soft.   Musculoskeletal:         General: Normal range of motion.      Cervical back: Normal range of motion and neck supple. No rigidity or tenderness.      Right lower leg: Edema present.      Left lower leg: Edema present.      Comments: 1+ edema bilaterally   Skin:     General: Skin is warm and dry.   Neurological:      General: No focal deficit present.      Mental Status: She is alert and oriented to person, place, and time. Mental status is at baseline.   Psychiatric:         Mood and Affect: Mood normal.         Behavior: Behavior normal.         Thought Content: Thought content normal.         Judgment: Judgment normal.           Current Outpatient Medications   Medication Instructions    atenoloL (TENORMIN) 25 mg, Oral, Daily    diclofenac (VOLTAREN) 75 mg, Oral, 2 times daily    gabapentin (NEURONTIN) 300 mg, Oral, Nightly    linaCLOtide (LINZESS) 145 mcg, Oral, Before breakfast    magnesium citrate solution 150 mLs, 2 times daily    nitroGLYCERIN (NITROSTAT) 0.4 mg, Every 5 min PRN    pantoprazole (PROTONIX) 40 mg, Oral, Daily    polyethylene glycol (GLYCOLAX) 17 g, Oral, Daily    rosuvastatin (CRESTOR) 5 mg, Oral, Daily    sumatriptan (IMITREX) 100 mg, Oral, As needed (PRN), Can repeat in 2 hours. Max 2 tabs in 24 hours        All medications, laboratory studies, cardiac diagnostic imaging independently reviewed.     Lab Results   Component Value Date    .00 02/22/2023    .00 08/05/2022    TRIG 48 02/22/2023    TRIG 83 08/05/2022    CREATININE 0.88 02/22/2023    MG 2.2 03/12/2020    K 4.7 02/22/2023        ASSESSMENT/PLAN:  Leonora Barnes 64 y.o. female with PMHx of HLD, SVT on Holter 2020 and unspecified cardiac surgery in 2012 to repair "hole" presenting today for follow up. Normal LVEF in 2020 and LHC done as a result of abnormal nuclear scan w/o CAD. Upon reviewing last angiogram from 2020, no concern for blockages at this " time. Chest pain likely related to anxiety vs indigestion/GERD.     - Restart nitroglycerin for chest pain  - Continue protonix, atenolol, atorvastatin  - Obtain echocardiogram  - Defer to PCP for sleep study   - RTC 4 months to reevaluate chest pain    Kayce Lozano, MS3

## 2024-04-26 NOTE — PROGRESS NOTES
Cardiology Attending Addendum to Medical Student Note    I evaluated Leonora Barnes and discussed the patient's symptoms, findings, and management plan with the medical student.  Please see the Cardiology note for details.    Ms. Leonora Barnes is a 64 y.o. female with:   Chief Complaint   Patient presents with    Palpitations and chest pain          HPI  Ms. Leonora Barnes was seen in Cardiology Clinic.  The patient has HLD and chest pain.  Today the patient is seen for a follow up appointment. The patient had a coronary angiogram in  which revealed normal coronary arteries.  The patient continues to have episodes of chest pain.  Patient states that when she took nitroglycerin it helped.  The patient also was previously placed on Protonix.  The patient is on atenolol for her SVT.    ROS:  Please see HPI    PMH:    Patient Active Problem List   Diagnosis    Restless leg syndrome    Pain in both lower extremities    Chronic constipation    Hematochezia    SVT (supraventricular tachycardia)    Migraine without status migrainosus, not intractable    Mixed hyperlipidemia    Atypical chest pain    GERD without esophagitis     Surgical Hx:    Past Surgical History:   Procedure Laterality Date    CARDIAC SURGERY       SECTION         Social History     Socioeconomic History    Marital status: Single   Tobacco Use    Smoking status: Never    Smokeless tobacco: Never   Substance and Sexual Activity    Alcohol use: Never    Drug use: Never    Sexual activity: Not Currently     Partners: Male       Family History   Problem Relation Name Age of Onset    Asthma Mother      Hypertension Father         Review of patient's allergies indicates:  No Known Allergies    Current Medications:    Current Outpatient Medications:     atenoloL (TENORMIN) 25 MG tablet, Take 1 tablet (25 mg total) by mouth once daily., Disp: 30 tablet, Rfl: 2    gabapentin (NEURONTIN) 300 MG capsule, Take 1 capsule  (300 mg total) by mouth every evening., Disp: 30 capsule, Rfl: 1    sumatriptan (IMITREX) 100 MG tablet, Take 1 tablet (100 mg total) by mouth as needed for Migraine. Can repeat in 2 hours. Max 2 tabs in 24 hours, Disp: 10 tablet, Rfl: 0    diclofenac (VOLTAREN) 75 MG EC tablet, Take 1 tablet (75 mg total) by mouth 2 (two) times daily. for 7 days (Patient not taking: Reported on 4/26/2024), Disp: 14 tablet, Rfl: 0    linaCLOtide (LINZESS) 145 mcg Cap capsule, Take 1 capsule (145 mcg total) by mouth before breakfast. (Patient not taking: Reported on 4/26/2024), Disp: 30 capsule, Rfl: 5    magnesium citrate solution, Take 150 mLs by mouth 2 (two) times a day. (Patient not taking: Reported on 4/26/2024), Disp: , Rfl:     nitroGLYCERIN (NITROSTAT) 0.4 MG SL tablet, Place 1 tablet (0.4 mg total) under the tongue every 5 (five) minutes as needed for Chest pain., Disp: 25 tablet, Rfl: 3    pantoprazole (PROTONIX) 20 MG tablet, Take 1 tablet (20 mg total) by mouth once daily., Disp: 30 tablet, Rfl: 5    polyethylene glycol (GLYCOLAX) 17 gram/dose powder, Take 17 g by mouth once daily. (Patient not taking: Reported on 4/26/2024), Disp: 850 g, Rfl: 0    rosuvastatin (CRESTOR) 5 MG tablet, Take 1 tablet (5 mg total) by mouth once daily., Disp: 90 tablet, Rfl: 3  Current Outpatient Medications   Medication Instructions    atenoloL (TENORMIN) 25 mg, Oral, Daily    diclofenac (VOLTAREN) 75 mg, Oral, 2 times daily    gabapentin (NEURONTIN) 300 mg, Oral, Nightly    linaCLOtide (LINZESS) 145 mcg, Oral, Before breakfast    magnesium citrate solution 150 mLs, 2 times daily    nitroGLYCERIN (NITROSTAT) 0.4 mg, Sublingual, Every 5 min PRN    pantoprazole (PROTONIX) 20 mg, Oral, Daily    polyethylene glycol (GLYCOLAX) 17 g, Oral, Daily    rosuvastatin (CRESTOR) 5 mg, Oral, Daily    sumatriptan (IMITREX) 100 mg, Oral, As needed (PRN), Can repeat in 2 hours. Max 2 tabs in 24 hours       ROS:  Please see HPI.    BP Readings from Last 3  "Encounters:   04/26/24 129/71   04/24/24 104/64   08/09/23 126/88        Pulse Readings from Last 3 Encounters:   04/26/24 63   04/24/24 66   08/09/23 60        Temp Readings from Last 3 Encounters:   04/26/24 98 °F (36.7 °C) (Oral)   04/24/24 97.4 °F (36.3 °C) (Oral)   08/09/23 97.4 °F (36.3 °C) (Oral)     Wt Readings from Last 3 Encounters:   04/26/24 80.7 kg (178 lb)   04/24/24 81.2 kg (178 lb 15.5 oz)   08/09/23 80.7 kg (178 lb)     BMI:  28.73 kg/m^2    PE  Blood pressure 129/71, pulse 63, temperature 98 °F (36.7 °C), temperature source Oral, resp. rate 20, height 5' 6" (1.676 m), weight 80.7 kg (178 lb), SpO2 100%.  CONSTITUTIONAL:  No acute distress.  Not ill appearing.  GENERAL:  Pleasant  NECK:  supple  RESPIRATIONS:  no apparent distress  ABDOMEN:   nondistended  SKIN:  moist   EXTREMITIES:   + edema, trace and nonpitting    CARDIAC TESTS  ECHO  Results for orders placed during the hospital encounter of 09/12/22    Echo    Interpretation Summary  · The left ventricle is normal in size with normal systolic function.  · The estimated ejection fraction is 70%.  · Normal left ventricular diastolic function.  · Normal right ventricular size with normal right ventricular systolic function.  · Mild right atrial enlargement.  · Mild pulmonic regurgitation.  · Mild mitral regurgitation.  · Mild tricuspid regurgitation.  · There is no pulmonary hypertension.  · The estimated PA systolic pressure is 36 mmHg.  · Normal central venous pressure (3 mmHg).    STRESS TEST  No results found for this or any previous visit.    [facility]  No results found for this or any previous visit.      LABS  Last BMP  Lab Results   Component Value Date     02/22/2023    K 4.7 02/22/2023    CO2 29 02/22/2023    BUN 23.3 (H) 02/22/2023    CREATININE 0.88 02/22/2023    CALCIUM 10.1 02/22/2023    EGFRNORACEVR >60 02/22/2023       Lab Results   Component Value Date    CREATININE 0.88 02/22/2023    CREATININE 0.82 05/26/2022    CREATININE 0.83 " "09/09/2021     No results found for: "BNP"  Lab Results   Component Value Date    TROPONINI <0.010 05/26/2022    TROPONINI <0.015 03/31/2020    TROPONINI <0.015 03/11/2020     Last CBC     Lab Results   Component Value Date    WBC 5.7 02/22/2023    HGB 13.7 02/22/2023    HCT 41.2 02/22/2023    MCV 88.8 02/22/2023     02/22/2023           Last lipids    Lab Results   Component Value Date    CHOL 229 (H) 02/22/2023    CHOL 189 08/05/2022    CHOL 244 (H) 09/09/2021    HDL 88 (H) 02/22/2023    HDL 70 (H) 08/05/2022    HDL 82 (H) 09/09/2021    .00 02/22/2023    .00 08/05/2022    .00 (H) 09/09/2021    TRIG 48 02/22/2023    TRIG 83 08/05/2022    TRIG 72 09/09/2021    TOTALCHOLEST 3 02/22/2023    TOTALCHOLEST 3 08/05/2022    TOTALCHOLEST 3 09/09/2021       LFT   No components found for: "LFT"    ASSESSMENT  1. Mixed hyperlipidemia    2. SVT (supraventricular tachycardia)    3. Atypical chest pain  - nitroGLYCERIN (NITROSTAT) 0.4 MG SL tablet; Place 1 tablet (0.4 mg total) under the tongue every 5 (five) minutes as needed for Chest pain.  Dispense: 25 tablet; Refill: 3    4. GERD without esophagitis    5. Hyperlipidemia, unspecified hyperlipidemia type  - rosuvastatin (CRESTOR) 5 MG tablet; Take 1 tablet (5 mg total) by mouth once daily.  Dispense: 90 tablet; Refill: 3    6. Gastroesophageal reflux disease, unspecified whether esophagitis present  - pantoprazole (PROTONIX) 20 MG tablet; Take 1 tablet (20 mg total) by mouth once daily.  Dispense: 30 tablet; Refill: 5    7. Undiagnosed cardiac murmurs  - Echo; Future    10 Year Cardiovascular Risk:  The ASCVD Risk score (Neris DK, et al., 2019) failed to calculate for the following reasons:    Unable to determine if patient is Non-   BMI:  Body mass index is 28.73 kg/m².  Patient is overweight (BMI 25-29.0)  Last PCP visit:  8/9/2023      PLAN  Medications:  Refill Crestor, Protonix, nitroglycerin  Work up:  obtain echo due " to the murmur  Follow up:   4 months

## 2024-04-30 ENCOUNTER — OFFICE VISIT (OUTPATIENT)
Dept: GYNECOLOGY | Facility: CLINIC | Age: 64
End: 2024-04-30
Payer: MEDICAID

## 2024-04-30 VITALS
RESPIRATION RATE: 18 BRPM | TEMPERATURE: 98 F | HEIGHT: 66 IN | DIASTOLIC BLOOD PRESSURE: 60 MMHG | HEART RATE: 66 BPM | OXYGEN SATURATION: 100 % | BODY MASS INDEX: 28.95 KG/M2 | WEIGHT: 180.13 LBS | SYSTOLIC BLOOD PRESSURE: 102 MMHG

## 2024-04-30 DIAGNOSIS — Z01.419 WOMEN'S ANNUAL ROUTINE GYNECOLOGICAL EXAMINATION: Primary | ICD-10-CM

## 2024-04-30 DIAGNOSIS — Z12.31 SCREENING MAMMOGRAM FOR BREAST CANCER: ICD-10-CM

## 2024-04-30 LAB
OHS QRS DURATION: 92 MS
OHS QTC CALCULATION: 437 MS

## 2024-04-30 PROCEDURE — 3078F DIAST BP <80 MM HG: CPT | Mod: CPTII,,, | Performed by: NURSE PRACTITIONER

## 2024-04-30 PROCEDURE — 1160F RVW MEDS BY RX/DR IN RCRD: CPT | Mod: CPTII,,, | Performed by: NURSE PRACTITIONER

## 2024-04-30 PROCEDURE — 99214 OFFICE O/P EST MOD 30 MIN: CPT | Mod: PBBFAC | Performed by: NURSE PRACTITIONER

## 2024-04-30 PROCEDURE — 1159F MED LIST DOCD IN RCRD: CPT | Mod: CPTII,,, | Performed by: NURSE PRACTITIONER

## 2024-04-30 PROCEDURE — 99396 PREV VISIT EST AGE 40-64: CPT | Mod: S$PBB,,, | Performed by: NURSE PRACTITIONER

## 2024-04-30 PROCEDURE — 3074F SYST BP LT 130 MM HG: CPT | Mod: CPTII,,, | Performed by: NURSE PRACTITIONER

## 2024-04-30 PROCEDURE — 3008F BODY MASS INDEX DOCD: CPT | Mod: CPTII,,, | Performed by: NURSE PRACTITIONER

## 2024-04-30 NOTE — PROGRESS NOTES
"Patient ID: Leonora Barnes is a 64 y.o. female.    Chief Complaint: Well Woman      Review of patient's allergies indicates:  No Known Allergies      Past Medical History:   Diagnosis Date    Chronic constipation     GERD with esophagitis     Migraine without status migrainosus, not intractable     Mixed hyperlipidemia     RLS (restless legs syndrome)     SVT (supraventricular tachycardia)             HPI:  Pt is  here for annual gyn exam. Denies hx of abnormal pap. Last pap 3/30/2022-NIL; HPV 16/18/45 negative. Pt is postmenopausal. Denies vaginal bleeding/discharge. Denies pain. NO gyn complaints. States is not sexually active. Denies fly hx of breast, ovarian, uterine, or colon cancer. Pt is nonsmoker. Pt is Malay speaking. Communicated with pt via language line using the i-pad  Shamar #[id number]  PMHx Migraines, HTN, HLD  PSHx csection x 2; heart surgery (unable to specify)     Review of Systems:   Negative except for findings in HPI     Objective:   /60   Pulse 66   Temp 97.9 °F (36.6 °C) (Oral)   Resp 18   Ht 5' 6" (1.676 m)   Wt 81.7 kg (180 lb 1.9 oz)   SpO2 100%   BMI 29.07 kg/m²    Physical Exam:  General: Alert and oriented, No acute distress.  Breast: No mass, No tenderness, No discharge.   Gastrointestinal: Soft, Non-tender.vertical scar  Gynecology:  Labia: Bilateral, Majora, Minora, Within normal limits.  Vagina: pale; dry mucosa  Cervix: No cervical motion tenderness, No lesions.  Uterus: appears partially adhered to abdominal wall, Not tender.  Ovaries: Not tender, No mass.  Integumentary: Warm, Dry.  Neurologic: Alert, Oriented.  Psychiatric: Cooperative, Appropriate mood & affect.    Assessment:   Women's annual routine gynecological examination    Screening mammogram for breast cancer  -     Mammo Digital Screening Bilat w/ Scott; Future; Expected date: 2024            1. Women's annual routine gynecological examination    2. Screening mammogram for " breast cancer             -pap UTD  -Contact clinic with any vaginal bleeding as this would be abnormal in postmenopausal pt.   Plan:       Follow up in about 1 year (around 4/30/2025).

## 2024-05-10 ENCOUNTER — HOSPITAL ENCOUNTER (OUTPATIENT)
Dept: RADIOLOGY | Facility: HOSPITAL | Age: 64
Discharge: HOME | End: 2024-05-10
Attending: NURSE PRACTITIONER
Payer: MEDICAID

## 2024-05-10 DIAGNOSIS — Z12.31 SCREENING MAMMOGRAM FOR BREAST CANCER: ICD-10-CM

## 2024-05-10 PROCEDURE — 77063 BREAST TOMOSYNTHESIS BI: CPT | Mod: TC

## 2024-05-10 PROCEDURE — 77067 SCR MAMMO BI INCL CAD: CPT | Mod: 26,,, | Performed by: RADIOLOGY

## 2024-05-10 PROCEDURE — 77063 BREAST TOMOSYNTHESIS BI: CPT | Mod: 26,,, | Performed by: RADIOLOGY

## 2024-06-07 DIAGNOSIS — G25.81 RESTLESS LEG SYNDROME: ICD-10-CM

## 2024-06-07 RX ORDER — GABAPENTIN 300 MG/1
300 CAPSULE ORAL NIGHTLY
Qty: 30 CAPSULE | Refills: 1 | Status: SHIPPED | OUTPATIENT
Start: 2024-06-07

## 2024-07-11 ENCOUNTER — OFFICE VISIT (OUTPATIENT)
Dept: FAMILY MEDICINE | Facility: CLINIC | Age: 64
End: 2024-07-11
Payer: MEDICAID

## 2024-07-11 VITALS
HEIGHT: 66 IN | OXYGEN SATURATION: 98 % | SYSTOLIC BLOOD PRESSURE: 117 MMHG | WEIGHT: 184 LBS | RESPIRATION RATE: 18 BRPM | HEART RATE: 64 BPM | BODY MASS INDEX: 29.57 KG/M2 | TEMPERATURE: 98 F | DIASTOLIC BLOOD PRESSURE: 77 MMHG

## 2024-07-11 DIAGNOSIS — M25.50 POLYARTHRALGIA: Primary | ICD-10-CM

## 2024-07-11 DIAGNOSIS — M79.641 BILATERAL HAND PAIN: ICD-10-CM

## 2024-07-11 DIAGNOSIS — M79.642 BILATERAL HAND PAIN: ICD-10-CM

## 2024-07-11 DIAGNOSIS — M75.102 ROTATOR CUFF SYNDROME OF LEFT SHOULDER: ICD-10-CM

## 2024-07-11 DIAGNOSIS — G25.81 RESTLESS LEG SYNDROME: ICD-10-CM

## 2024-07-11 PROCEDURE — 99215 OFFICE O/P EST HI 40 MIN: CPT | Mod: S$PBB,,, | Performed by: FAMILY MEDICINE

## 2024-07-11 PROCEDURE — 99214 OFFICE O/P EST MOD 30 MIN: CPT | Mod: PBBFAC | Performed by: FAMILY MEDICINE

## 2024-07-11 PROCEDURE — 3008F BODY MASS INDEX DOCD: CPT | Mod: CPTII,,, | Performed by: FAMILY MEDICINE

## 2024-07-11 PROCEDURE — 1159F MED LIST DOCD IN RCRD: CPT | Mod: CPTII,,, | Performed by: FAMILY MEDICINE

## 2024-07-11 PROCEDURE — 3074F SYST BP LT 130 MM HG: CPT | Mod: CPTII,,, | Performed by: FAMILY MEDICINE

## 2024-07-11 PROCEDURE — 1160F RVW MEDS BY RX/DR IN RCRD: CPT | Mod: CPTII,,, | Performed by: FAMILY MEDICINE

## 2024-07-11 PROCEDURE — 3078F DIAST BP <80 MM HG: CPT | Mod: CPTII,,, | Performed by: FAMILY MEDICINE

## 2024-07-11 RX ORDER — GABAPENTIN 300 MG/1
300 CAPSULE ORAL 2 TIMES DAILY
Qty: 60 CAPSULE | Refills: 2 | Status: SHIPPED | OUTPATIENT
Start: 2024-07-11

## 2024-07-11 RX ORDER — DICLOFENAC SODIUM 75 MG/1
75 TABLET, DELAYED RELEASE ORAL 2 TIMES DAILY PRN
Qty: 60 TABLET | Refills: 1 | Status: SHIPPED | OUTPATIENT
Start: 2024-07-11

## 2024-07-11 RX ORDER — METHOCARBAMOL 500 MG/1
500 TABLET, FILM COATED ORAL 2 TIMES DAILY PRN
Qty: 60 TABLET | Refills: 2 | Status: SHIPPED | OUTPATIENT
Start: 2024-07-11

## 2024-07-11 NOTE — PROGRESS NOTES
"Patient Name: Leonora Barnes   : 1960  MRN: [id number]     SUBJECTIVE:  Leonora Barnes is a 64 y.o. female here for Generalized Body Aches (The patient states that she has pain everywhere. )  .    HPI  Here for joint pain/muscle pains generalized.   used throughout the encounter # [id number]  Patient does have restless leg syndrome and last visit increased gabapentin to 300 mg nightly.  Of note KATHY normal and ferritin normal.  Feels like bone and muscles are hurting all over her body. Chronic for years but worse lately. Gabapentin helps but next day the pain comes back again. Helps with the RLS though.   Pain is in her neck, shoulders, back, legs etc. Left shoulder hurts the most, can not lift it past 90 degrees. "A long time like this".  Sometimes stiffness of the hand in the morning. Massages her hand and gets better within few min.  FH of RA, lupus: none.  Has tried ibuprofen and helped.    Imaging Impression:  "Minimal degenerative changes.Slight elevation of the clavicle in relation to the acromion might indicate a mild degree of acromioclavicular injury."      ALLERGIES: Review of patient's allergies indicates:  No Known Allergies      ROS:  Review of Systems   Constitutional:  Negative for chills and fever.   HENT:  Negative for congestion.    Eyes:  Negative for blurred vision.   Respiratory:  Negative for cough and shortness of breath.    Cardiovascular:  Negative for chest pain, palpitations and leg swelling.   Gastrointestinal:  Negative for abdominal pain, blood in stool, diarrhea, nausea and vomiting.   Genitourinary:  Negative for dysuria and hematuria.   Musculoskeletal:  Positive for joint pain and myalgias.   Neurological:  Negative for dizziness and headaches.   Psychiatric/Behavioral:  Negative for depression. The patient is not nervous/anxious.          OBJECTIVE:  Vital signs  Vitals:    24 1328   BP: 117/77   Pulse: 64   Resp: 18   Temp: 97.6 °F " "(36.4 °C)   TempSrc: Oral   SpO2: 98%   Weight: 83.5 kg (184 lb)   Height: 5' 6" (1.676 m)      Body mass index is 29.7 kg/m².    PHYSICAL EXAM:   Physical Exam  Vitals reviewed.   Constitutional:       General: She is not in acute distress.     Appearance: Normal appearance. She is not ill-appearing.   HENT:      Head: Normocephalic and atraumatic.      Right Ear: External ear normal.      Left Ear: External ear normal.      Nose: Nose normal. No rhinorrhea.      Mouth/Throat:      Mouth: Mucous membranes are moist.   Eyes:      General: No scleral icterus.        Right eye: No discharge.         Left eye: No discharge.      Conjunctiva/sclera: Conjunctivae normal.      Pupils: Pupils are equal, round, and reactive to light.   Cardiovascular:      Rate and Rhythm: Normal rate and regular rhythm.   Pulmonary:      Effort: Pulmonary effort is normal. No respiratory distress.      Breath sounds: No wheezing, rhonchi or rales.   Abdominal:      General: Bowel sounds are normal. There is no distension.      Palpations: Abdomen is soft.      Tenderness: There is no abdominal tenderness.   Musculoskeletal:         General: Tenderness (Multiple muscle tender points in her neck, shoulders, arms, back, pelvis, legs. left shoulder specifically:  Pain anteriorly and can not lift arm over 90°.  Empty can positive.  Can not reach hand over back) present. Normal range of motion.      Cervical back: Normal range of motion and neck supple. No rigidity or tenderness.      Right lower leg: No edema.      Left lower leg: No edema.   Skin:     General: Skin is warm.      Findings: No rash.   Neurological:      General: No focal deficit present.      Mental Status: She is alert and oriented to person, place, and time.      Sensory: No sensory deficit.      Motor: No weakness.   Psychiatric:         Mood and Affect: Mood normal.         Behavior: Behavior normal.          ASSESSMENT/PLAN:  1. Polyarthralgia  Will check further workup " with autoimmune/rheumatologic markers.  Also suspicious for fibromyalgia.  Increase gabapentin to 300 mg b.i.d.  Cymbalta will be also an option, but patient would like to try the increased dose of gabapentin 1st.  Robaxin as needed.  -     Antinuclear Antibody (LISA), HEp-2, IgG; Future; Expected date: 07/11/2024  -     Lupus Comprehensive Panel; Future; Expected date: 07/11/2024  -     Rheumatoid Factor IgA; Future; Expected date: 07/11/2024  -     Rheumatoid Factor IgM; Future; Expected date: 07/11/2024  -     Rheumatoid Quantitative; Future; Expected date: 07/11/2024  -     gabapentin (NEURONTIN) 300 MG capsule; Take 1 capsule (300 mg total) by mouth 2 (two) times daily.  Dispense: 60 capsule; Refill: 2  -     methocarbamoL (ROBAXIN) 500 MG Tab; Take 1 tablet (500 mg total) by mouth 2 (two) times daily as needed (pain/spasm).  Dispense: 60 tablet; Refill: 2  -     CYCLIC CITRULLINATED PEPTIDE (CCP) ANTIBODY; Future; Expected date: 07/11/2024  -     Miscellaneous Test, Sendout anti dsDNA antibody; Future; Expected date: 07/11/2024    2. Restless leg syndrome  Well-controlled.  Continue with gabapentin  -     gabapentin (NEURONTIN) 300 MG capsule; Take 1 capsule (300 mg total) by mouth 2 (two) times daily.  Dispense: 60 capsule; Refill: 2    3. Bilateral hand pain  Will check markers as above.  Tylenol as needed and diclofenac.    4. Rotator cuff syndrome of left shoulder  Can take Voltaren and Robaxin as needed until seeing orthopedics.  Referral placed.  Also PT  -     diclofenac (VOLTAREN) 75 MG EC tablet; Take 1 tablet (75 mg total) by mouth 2 (two) times daily as needed (pain).  Dispense: 60 tablet; Refill: 1  -     Ambulatory referral/consult to Orthopedics; Future; Expected date: 07/18/2024  -     Ambulatory referral/consult to Physical/Occupational Therapy; Future; Expected date: 07/18/2024  -     methocarbamoL (ROBAXIN) 500 MG Tab; Take 1 tablet (500 mg total) by mouth 2 (two) times daily as needed  (pain/spasm).  Dispense: 60 tablet; Refill: 2         I spent a total of 45 minutes on the day of the visit.This includes face to face time and non-face to face time preparing to see the patient (eg, review of tests), obtaining and/or reviewing separately obtained history, documenting clinical information in the electronic or other health record, independently interpreting results and communicating results to the patient/family/caregiver, or care coordinator.        Previous medical history/lab work/radiology reviewed and considered during medical management decisions.   Medication list reviewed and medication reconciliation performed.  Patient was provided  and care about his/her current diagnosis (es) and medications including risk/benefit and side effects/adverse events, over the counter medication uses/doses, home self-care and contact precautions,  and red flags and indications for when to seek immediate medical attention.   Patient was advised to continue compliance with current medication list and medical recommendations.  Recommended/ Advised continued compliance with recommended eating habits/ diets for medical conditions and exercise 150 minutes/ week (if possible) for medical condition (s).        RESULTS:  Recent Results (from the past 1008 hour(s))   Echo    Collection Time: 07/22/24  9:00 AM   Result Value Ref Range    BSA 1.97 m2    Coleman's Biplane MOD Ejection Fraction 62 %    A2C EF 62 %    A4C EF 60 %    LVOT stroke volume 84.69 cm3    LVIDd 4.45 3.5 - 6.0 cm    LV Systolic Volume 38.01 mL    LV Systolic Volume Index 19.7 mL/m2    LVIDs 3.10 2.1 - 4.0 cm    LV ESV A2C 25.58 mL    LV Diastolic Volume 89.90 mL    LV ESV A4C 31.82 mL    LV Diastolic Volume Index 46.58 mL/m2    LV EDV A2C 79.851454959810159 mL    LV EDV A4C 72.96 mL    Left Ventricular End Systolic Volume by Teichholz Method 38.01 mL    Left Ventricular End Diastolic Volume by Teichholz Method 89.90 mL    IVS 0.91 0.6 - 1.1 cm     LVOT diameter 1.97 cm    LVOT area 3.0 cm2    FS 30 28 - 44 %    Left Ventricle Relative Wall Thickness 0.25 cm    Posterior Wall 0.56 (A) 0.6 - 1.1 cm    LV mass 99.90 g    LV Mass Index 52 g/m2    MV Peak E Nestor 0.67 m/s    TDI LATERAL 0.15 m/s    MV Peak A Nestor 0.44 m/s    TR Max Nestor 2.99 m/s    E/A ratio 1.52     E wave deceleration time 240.46 msec    LV LATERAL E/E' RATIO 4.47 m/s    LVOT peak nestor 1.13 m/s    Left Ventricular Outflow Tract Mean Velocity 0.76 cm/s    Left Ventricular Outflow Tract Mean Gradient 2.73 mmHg    TAPSE 1.92 cm    LA size 4.46 cm    LA volume (mod) 28.27 cm3    LA Volume Index (Mod) 14.6 mL/m2    RA Major Axis 3.97 cm    AV mean gradient 6 mmHg    AV peak gradient 9 mmHg    Ao peak nestor 1.54 m/s    Ao VTI 33.90 cm    LVOT peak VTI 27.80 cm    AV valve area 2.50 cm²    AV Velocity Ratio 0.73     AV index (prosthetic) 0.82     SINCERE by Velocity Ratio 2.24 cm²    Mr max nestor 4.77 m/s    MV mean gradient 1 mmHg    MV peak gradient 3 mmHg    MV valve area by continuity eq 2.42 cm2    MV VTI 35.0 cm    Triscuspid Valve Regurgitation Peak Gradient 36 mmHg    ZLVIDS -0.64     ZLVIDD -2.05     LA area A4C 13.56 cm2    LA area A2C 12.35 cm2    Mitral Valve Heart Rate 57 bpm    TV resting pulmonary artery pressure 39 mmHg    RV TB RVSP 6 mmHg    Est. RA pres 3 mmHg    IVC diameter 1.6 cm         Follow Up:  Follow up for as scheduled..         This note was created with the assistance of a voice recognition software or phone dictation. There may be transcription errors as a result of using this technology however minimal. Effort has been made to assure accuracy of transcription but any obvious errors or omissions should be clarified with the author of the document

## 2024-07-12 DIAGNOSIS — G43.809 OTHER MIGRAINE WITHOUT STATUS MIGRAINOSUS, NOT INTRACTABLE: ICD-10-CM

## 2024-07-16 RX ORDER — SUMATRIPTAN SUCCINATE 100 MG/1
100 TABLET ORAL
Qty: 10 TABLET | Refills: 0 | Status: SHIPPED | OUTPATIENT
Start: 2024-07-16

## 2024-07-22 ENCOUNTER — HOSPITAL ENCOUNTER (OUTPATIENT)
Dept: CARDIOLOGY | Facility: HOSPITAL | Age: 64
Discharge: HOME | End: 2024-07-22
Attending: INTERNAL MEDICINE
Payer: MEDICAID

## 2024-07-22 VITALS
SYSTOLIC BLOOD PRESSURE: 125 MMHG | DIASTOLIC BLOOD PRESSURE: 80 MMHG | BODY MASS INDEX: 29.57 KG/M2 | WEIGHT: 184 LBS | HEIGHT: 66 IN

## 2024-07-22 DIAGNOSIS — R01.1 UNDIAGNOSED CARDIAC MURMURS: ICD-10-CM

## 2024-07-22 LAB
APICAL FOUR CHAMBER EJECTION FRACTION: 60 %
APICAL TWO CHAMBER EJECTION FRACTION: 62 %
AV INDEX (PROSTH): 0.82
AV MEAN GRADIENT: 6 MMHG
AV PEAK GRADIENT: 9 MMHG
AV VALVE AREA BY VELOCITY RATIO: 2.24 CM²
AV VALVE AREA: 2.5 CM²
AV VELOCITY RATIO: 0.73
BSA FOR ECHO PROCEDURE: 1.97 M2
CV ECHO LV RWT: 0.25 CM
DOP CALC AO PEAK VEL: 1.54 M/S
DOP CALC AO VTI: 33.9 CM
DOP CALC LVOT AREA: 3 CM2
DOP CALC LVOT DIAMETER: 1.97 CM
DOP CALC LVOT PEAK VEL: 1.13 M/S
DOP CALC LVOT STROKE VOLUME: 84.69 CM3
DOP CALC MV VTI: 35 CM
DOP CALCLVOT PEAK VEL VTI: 27.8 CM
E WAVE DECELERATION TIME: 240.46 MSEC
E/A RATIO: 1.52
ECHO LV POSTERIOR WALL: 0.56 CM (ref 0.6–1.1)
FRACTIONAL SHORTENING: 30 % (ref 28–44)
HR MV ECHO: 57 BPM
INTERVENTRICULAR SEPTUM: 0.91 CM (ref 0.6–1.1)
IVC DIAMETER: 1.6 CM
LEFT ATRIUM AREA SYSTOLIC (APICAL 2 CHAMBER): 12.35 CM2
LEFT ATRIUM AREA SYSTOLIC (APICAL 4 CHAMBER): 13.56 CM2
LEFT ATRIUM SIZE: 4.46 CM
LEFT ATRIUM VOLUME INDEX MOD: 14.6 ML/M2
LEFT ATRIUM VOLUME MOD: 28.27 CM3
LEFT INTERNAL DIMENSION IN SYSTOLE: 3.1 CM (ref 2.1–4)
LEFT VENTRICLE DIASTOLIC VOLUME INDEX: 46.58 ML/M2
LEFT VENTRICLE DIASTOLIC VOLUME: 89.9 ML
LEFT VENTRICLE END DIASTOLIC VOLUME APICAL 2 CHAMBER: 79.95 ML
LEFT VENTRICLE END DIASTOLIC VOLUME APICAL 4 CHAMBER: 72.96 ML
LEFT VENTRICLE END SYSTOLIC VOLUME APICAL 2 CHAMBER: 25.58 ML
LEFT VENTRICLE END SYSTOLIC VOLUME APICAL 4 CHAMBER: 31.82 ML
LEFT VENTRICLE MASS INDEX: 52 G/M2
LEFT VENTRICLE SYSTOLIC VOLUME INDEX: 19.7 ML/M2
LEFT VENTRICLE SYSTOLIC VOLUME: 38.01 ML
LEFT VENTRICULAR INTERNAL DIMENSION IN DIASTOLE: 4.45 CM (ref 3.5–6)
LEFT VENTRICULAR MASS: 99.9 G
LV LATERAL E/E' RATIO: 4.47 M/S
LVED V (TEICH): 89.9 ML
LVES V (TEICH): 38.01 ML
LVOT MG: 2.73 MMHG
LVOT MV: 0.76 CM/S
MV MEAN GRADIENT: 1 MMHG
MV PEAK A VEL: 0.44 M/S
MV PEAK E VEL: 0.67 M/S
MV PEAK GRADIENT: 3 MMHG
MV VALVE AREA BY CONTINUITY EQUATION: 2.42 CM2
OHS LV EJECTION FRACTION SIMPSONS BIPLANE MOD: 62 %
PISA MRMAX VEL: 4.77 M/S
PISA TR MAX VEL: 2.99 M/S
RA MAJOR: 3.97 CM
RA PRESSURE ESTIMATED: 3 MMHG
RV TB RVSP: 6 MMHG
TDI LATERAL: 0.15 M/S
TR MAX PG: 36 MMHG
TRICUSPID ANNULAR PLANE SYSTOLIC EXCURSION: 1.92 CM
TV REST PULMONARY ARTERY PRESSURE: 39 MMHG
Z-SCORE OF LEFT VENTRICULAR DIMENSION IN END DIASTOLE: -2.05
Z-SCORE OF LEFT VENTRICULAR DIMENSION IN END SYSTOLE: -0.64

## 2024-07-22 PROCEDURE — 93306 TTE W/DOPPLER COMPLETE: CPT

## 2024-08-07 DIAGNOSIS — K21.9 GASTROESOPHAGEAL REFLUX DISEASE, UNSPECIFIED WHETHER ESOPHAGITIS PRESENT: ICD-10-CM

## 2024-08-07 RX ORDER — PANTOPRAZOLE SODIUM 20 MG/1
20 TABLET, DELAYED RELEASE ORAL DAILY
Qty: 30 TABLET | Refills: 5 | Status: SHIPPED | OUTPATIENT
Start: 2024-08-07 | End: 2025-08-07

## 2024-08-11 DIAGNOSIS — G43.809 OTHER MIGRAINE WITHOUT STATUS MIGRAINOSUS, NOT INTRACTABLE: ICD-10-CM

## 2024-08-13 DIAGNOSIS — G43.809 OTHER MIGRAINE WITHOUT STATUS MIGRAINOSUS, NOT INTRACTABLE: ICD-10-CM

## 2024-08-13 RX ORDER — SUMATRIPTAN SUCCINATE 100 MG/1
100 TABLET ORAL
Qty: 10 TABLET | Refills: 0 | OUTPATIENT
Start: 2024-08-13

## 2024-08-13 RX ORDER — SUMATRIPTAN SUCCINATE 100 MG/1
TABLET ORAL
Qty: 10 TABLET | Refills: 0 | OUTPATIENT
Start: 2024-08-13

## 2024-08-21 DIAGNOSIS — K21.9 GASTROESOPHAGEAL REFLUX DISEASE, UNSPECIFIED WHETHER ESOPHAGITIS PRESENT: ICD-10-CM

## 2024-08-21 DIAGNOSIS — G43.809 OTHER MIGRAINE WITHOUT STATUS MIGRAINOSUS, NOT INTRACTABLE: ICD-10-CM

## 2024-08-23 RX ORDER — PANTOPRAZOLE SODIUM 20 MG/1
20 TABLET, DELAYED RELEASE ORAL DAILY
Qty: 30 TABLET | Refills: 5 | Status: SHIPPED | OUTPATIENT
Start: 2024-08-23 | End: 2025-08-23

## 2024-08-23 RX ORDER — SUMATRIPTAN SUCCINATE 100 MG/1
100 TABLET ORAL
Qty: 10 TABLET | Refills: 0 | Status: SHIPPED | OUTPATIENT
Start: 2024-08-23

## 2024-09-09 DIAGNOSIS — M75.102 ROTATOR CUFF SYNDROME OF LEFT SHOULDER: ICD-10-CM

## 2024-09-09 RX ORDER — DICLOFENAC SODIUM 75 MG/1
75 TABLET, DELAYED RELEASE ORAL 2 TIMES DAILY PRN
Qty: 60 TABLET | Refills: 1 | Status: SHIPPED | OUTPATIENT
Start: 2024-09-09

## 2024-10-03 DIAGNOSIS — K21.9 GASTROESOPHAGEAL REFLUX DISEASE, UNSPECIFIED WHETHER ESOPHAGITIS PRESENT: ICD-10-CM

## 2024-10-07 RX ORDER — PANTOPRAZOLE SODIUM 20 MG/1
20 TABLET, DELAYED RELEASE ORAL DAILY
Qty: 30 TABLET | Refills: 5 | Status: SHIPPED | OUTPATIENT
Start: 2024-10-07 | End: 2025-10-07

## 2024-10-14 DIAGNOSIS — G43.809 OTHER MIGRAINE WITHOUT STATUS MIGRAINOSUS, NOT INTRACTABLE: ICD-10-CM

## 2024-10-18 RX ORDER — SUMATRIPTAN SUCCINATE 100 MG/1
TABLET ORAL
Qty: 9 TABLET | Refills: 0 | Status: SHIPPED | OUTPATIENT
Start: 2024-10-18

## 2024-11-06 ENCOUNTER — LAB VISIT (OUTPATIENT)
Dept: LAB | Facility: HOSPITAL | Age: 64
End: 2024-11-06
Attending: FAMILY MEDICINE
Payer: MEDICAID

## 2024-11-06 ENCOUNTER — OFFICE VISIT (OUTPATIENT)
Dept: FAMILY MEDICINE | Facility: CLINIC | Age: 64
End: 2024-11-06
Payer: MEDICAID

## 2024-11-06 VITALS
RESPIRATION RATE: 18 BRPM | WEIGHT: 181 LBS | HEART RATE: 60 BPM | DIASTOLIC BLOOD PRESSURE: 80 MMHG | OXYGEN SATURATION: 99 % | SYSTOLIC BLOOD PRESSURE: 136 MMHG | HEIGHT: 66 IN | BODY MASS INDEX: 29.09 KG/M2 | TEMPERATURE: 98 F

## 2024-11-06 DIAGNOSIS — Z23 IMMUNIZATION DUE: ICD-10-CM

## 2024-11-06 DIAGNOSIS — I47.10 SVT (SUPRAVENTRICULAR TACHYCARDIA): ICD-10-CM

## 2024-11-06 DIAGNOSIS — G43.809 OTHER MIGRAINE WITHOUT STATUS MIGRAINOSUS, NOT INTRACTABLE: ICD-10-CM

## 2024-11-06 DIAGNOSIS — M25.50 POLYARTHRALGIA: ICD-10-CM

## 2024-11-06 DIAGNOSIS — S49.90XS: ICD-10-CM

## 2024-11-06 DIAGNOSIS — M75.102 ROTATOR CUFF SYNDROME OF LEFT SHOULDER: Primary | ICD-10-CM

## 2024-11-06 DIAGNOSIS — G25.81 RESTLESS LEG SYNDROME: ICD-10-CM

## 2024-11-06 LAB — RHEUMATOID FACT SERPL-ACNC: <13 IU/ML

## 2024-11-06 PROCEDURE — 1160F RVW MEDS BY RX/DR IN RCRD: CPT | Mod: CPTII,,, | Performed by: FAMILY MEDICINE

## 2024-11-06 PROCEDURE — 86431 RHEUMATOID FACTOR QUANT: CPT | Mod: 59

## 2024-11-06 PROCEDURE — 99214 OFFICE O/P EST MOD 30 MIN: CPT | Mod: S$PBB,,, | Performed by: FAMILY MEDICINE

## 2024-11-06 PROCEDURE — 1159F MED LIST DOCD IN RCRD: CPT | Mod: CPTII,,, | Performed by: FAMILY MEDICINE

## 2024-11-06 PROCEDURE — 3075F SYST BP GE 130 - 139MM HG: CPT | Mod: CPTII,,, | Performed by: FAMILY MEDICINE

## 2024-11-06 PROCEDURE — 90471 IMMUNIZATION ADMIN: CPT | Mod: PBBFAC

## 2024-11-06 PROCEDURE — 86225 DNA ANTIBODY NATIVE: CPT

## 2024-11-06 PROCEDURE — 86039 ANTINUCLEAR ANTIBODIES (ANA): CPT

## 2024-11-06 PROCEDURE — 86200 CCP ANTIBODY: CPT

## 2024-11-06 PROCEDURE — 99214 OFFICE O/P EST MOD 30 MIN: CPT | Mod: PBBFAC | Performed by: FAMILY MEDICINE

## 2024-11-06 PROCEDURE — 36415 COLL VENOUS BLD VENIPUNCTURE: CPT

## 2024-11-06 PROCEDURE — 86431 RHEUMATOID FACTOR QUANT: CPT

## 2024-11-06 PROCEDURE — 90656 IIV3 VACC NO PRSV 0.5 ML IM: CPT | Mod: PBBFAC

## 2024-11-06 PROCEDURE — 86160 COMPLEMENT ANTIGEN: CPT

## 2024-11-06 PROCEDURE — 3008F BODY MASS INDEX DOCD: CPT | Mod: CPTII,,, | Performed by: FAMILY MEDICINE

## 2024-11-06 PROCEDURE — 3079F DIAST BP 80-89 MM HG: CPT | Mod: CPTII,,, | Performed by: FAMILY MEDICINE

## 2024-11-06 RX ORDER — ATENOLOL 25 MG/1
25 TABLET ORAL DAILY
Qty: 30 TABLET | Refills: 2 | Status: SHIPPED | OUTPATIENT
Start: 2024-11-06

## 2024-11-06 RX ORDER — SUMATRIPTAN SUCCINATE 100 MG/1
TABLET ORAL
Qty: 9 TABLET | Refills: 0 | Status: SHIPPED | OUTPATIENT
Start: 2024-11-06

## 2024-11-06 RX ADMIN — INFLUENZA VIRUS VACCINE 0.5 ML: 15; 15; 15 SUSPENSION INTRAMUSCULAR at 08:11

## 2024-11-06 NOTE — PROGRESS NOTES
Patient Name: Leonora Barnes   : 1960  MRN: [id number]     SUBJECTIVE:  Leonora Barnes is a 64 y.o. female here for Pain (The patient states that she has pain everywhere in her body. )  .    HPI  Here for generalized pain as discussed multiple visits.    used throughout the encounter #[id number]  Last visit advised patient to have labs done to check for Rheumatologic etiology  but patient has not done labs yet.  Patient does have restless leg syndrome and last visit increased gabapentin to 300 mg b.i.d.. but pt is taking only at night though. Robaxin once a day at night. They both work great and sleeps well but during the day pain restarts. Diclofenac as needed not daily.   Of note KATHY normal and ferritin normal.  This is a chronic issue for years.  The most bothersome problem issue for patient is left shoulder that she can not even lift it up.  Gabapentin helps but next day the pain comes back again. Helps with the RLS though.   Regarding left shoulder, patient denies any injury or trauma.  Ongoing for a long time at least a year now.  Was supposed to see Orthopedics last year but did not show up so it has been rescheduled but not until February of next year.  Has not done physical therapy either.    Imitrex for migraines. It did help. Not covered anymore at her pharmacy, advised patient to try this hospital pharmacy. Not too frequent headaches lately though. 1-2 times a month. Fiorcet has not helped in the past.  On atenolol for SVT follows with cardiplogy. No HTN issues.       ALLERGIES: Review of patient's allergies indicates:  No Known Allergies      ROS:  Review of Systems   Constitutional:  Negative for chills, fever and weight loss.   HENT:  Negative for congestion.    Eyes:  Negative for blurred vision.   Respiratory:  Negative for cough and shortness of breath.    Cardiovascular:  Negative for chest pain, palpitations and leg swelling.   Gastrointestinal:  Negative  "for abdominal pain, blood in stool, diarrhea, nausea and vomiting.   Genitourinary:  Negative for dysuria and hematuria.   Musculoskeletal:  Positive for back pain and joint pain. Negative for neck pain.   Neurological:  Negative for dizziness and headaches.   Psychiatric/Behavioral:  Negative for depression. The patient is not nervous/anxious.          OBJECTIVE:  Vital signs  Vitals:    11/06/24 0810   BP: 136/80   Pulse: 60   Resp: 18   Temp: 98.3 °F (36.8 °C)   TempSrc: Oral   SpO2: 99%   Weight: 82.1 kg (181 lb)   Height: 5' 6" (1.676 m)      Body mass index is 29.21 kg/m².    PHYSICAL EXAM:   Physical Exam  Vitals reviewed.   Constitutional:       General: She is not in acute distress.     Appearance: Normal appearance. She is not ill-appearing.   HENT:      Head: Normocephalic and atraumatic.      Right Ear: External ear normal.      Left Ear: External ear normal.      Nose: Nose normal. No rhinorrhea.      Mouth/Throat:      Mouth: Mucous membranes are moist.   Eyes:      General: No scleral icterus.        Right eye: No discharge.         Left eye: No discharge.      Conjunctiva/sclera: Conjunctivae normal.      Pupils: Pupils are equal, round, and reactive to light.   Cardiovascular:      Rate and Rhythm: Normal rate and regular rhythm.   Pulmonary:      Effort: No respiratory distress.      Breath sounds: No wheezing, rhonchi or rales.   Abdominal:      General: Bowel sounds are normal. There is no distension.      Palpations: Abdomen is soft.      Tenderness: There is no abdominal tenderness.   Musculoskeletal:      Cervical back: Normal range of motion and neck supple. No rigidity or tenderness.      Right lower leg: No edema.      Left lower leg: No edema.      Comments: Left shoulder:  Impaired range of motion especially over 90°.  Anterior shoulder pain.  Empty can positive   Skin:     General: Skin is warm.      Findings: No rash.   Neurological:      Mental Status: She is alert and oriented to " person, place, and time.   Psychiatric:         Mood and Affect: Mood normal.         Behavior: Behavior normal.          ASSESSMENT/PLAN:  1. Rotator cuff syndrome of left shoulder  Continue with diclofenac as needed and gabapentin, Robaxin.  Will send referral to physical therapy once more and to orthopedics hopefully to be seen earlier than the scheduled appointment.  -     Ambulatory referral/consult to Orthopedics; Future; Expected date: 11/13/2024    2. Acromioclavicular joint injury, unspecified laterality, sequela  -     Ambulatory referral/consult to Orthopedics; Future; Expected date: 11/13/2024    3. Restless leg syndrome  Stable on gabapentin but advised patient to start to take twice a day as prescribed to help more with pain.  Reminded patient to have labs done for the polyarthralgia.    4. Other migraine without status migrainosus, not intractable  Stable on Imitrex as needed.  -     sumatriptan (IMITREX) 100 MG tablet; TAKE 1 TABLET BY MOUTH AS NEEDED FOR MIGRAINE. CAN REPEAT DOSE IN 2 HOURS IF NECESSARY. MAX OF 2 TABLETS A DAY  Dispense: 9 tablet; Refill: 0    5. SVT (supraventricular tachycardia)  Continue with atenolol from Cardiology as well.  -     atenoloL (TENORMIN) 25 MG tablet; Take 1 tablet (25 mg total) by mouth once daily.  Dispense: 30 tablet; Refill: 2    6. Immunization due  -     influenza (Flulaval, Fluzone, Fluarix) 45 mcg/0.5 mL IM vaccine (> or = 6 mo) 0.5 mL             Previous medical history/lab work/radiology reviewed and considered during medical management decisions.   Medication list reviewed and medication reconciliation performed.  Patient was provided  and care about his/her current diagnosis (es) and medications including risk/benefit and side effects/adverse events, over the counter medication uses/doses, home self-care and contact precautions,  and red flags and indications for when to seek immediate medical attention.   Patient was advised to continue  compliance with current medication list and medical recommendations.  Recommended/ Advised continued compliance with recommended eating habits/ diets for medical conditions and exercise 150 minutes/ week (if possible) for medical condition (s).        RESULTS:  No results found for this or any previous visit (from the past 6 weeks).      Follow Up:  Follow up in about 6 months (around 5/6/2025) for extended visit, needs .     [unfilled]    This note was created with the assistance of a voice recognition software or phone dictation. There may be transcription errors as a result of using this technology however minimal. Effort has been made to assure accuracy of transcription but any obvious errors or omissions should be clarified with the author of the document

## 2024-11-08 DIAGNOSIS — M75.102 ROTATOR CUFF SYNDROME OF LEFT SHOULDER: ICD-10-CM

## 2024-11-08 DIAGNOSIS — M25.50 POLYARTHRALGIA: ICD-10-CM

## 2024-11-08 LAB
AR ANA INTERPRETIVE COMMENT: NORMAL
AR ANTINUCLEAR ANTIBODY (ANA), HEP-2, IGG: NORMAL
C3 SERPL-MCNC: 160 MG/DL
C4 SERPL-MCNC: 45 MG/DL
CYCLIC CITRULLINATED PEPTIDE (CCP) (OHS): 0.8 U/ML
DSDNA AB QUANT (OHS): 0.7 IU/ML
NUCLEAR IGG SER QL IA: ABNORMAL
RHEUMATOID FACT SERPL-ACNC: <10 IU/ML
RHEUMATOID FACTOR IGA QUANTITATIVE (OLG): 7.8 IU/ML
RHEUMATOID FACTOR IGM QUANTITATIVE (OLG): 3.4 IU/ML

## 2024-11-11 DIAGNOSIS — M75.102 ROTATOR CUFF SYNDROME OF LEFT SHOULDER: ICD-10-CM

## 2024-11-11 DIAGNOSIS — M25.50 POLYARTHRALGIA: ICD-10-CM

## 2024-11-11 RX ORDER — METHOCARBAMOL 500 MG/1
TABLET, FILM COATED ORAL
Qty: 60 TABLET | Refills: 2 | OUTPATIENT
Start: 2024-11-11

## 2024-11-12 ENCOUNTER — PATIENT MESSAGE (OUTPATIENT)
Dept: GASTROENTEROLOGY | Facility: CLINIC | Age: 64
End: 2024-11-12
Payer: MEDICAID

## 2024-11-13 RX ORDER — METHOCARBAMOL 500 MG/1
500 TABLET, FILM COATED ORAL 2 TIMES DAILY PRN
Qty: 60 TABLET | Refills: 2 | Status: SHIPPED | OUTPATIENT
Start: 2024-11-13

## 2024-11-27 ENCOUNTER — HOSPITAL ENCOUNTER (EMERGENCY)
Facility: HOSPITAL | Age: 64
Discharge: HOME | End: 2024-11-27
Attending: INTERNAL MEDICINE
Payer: MEDICAID

## 2024-11-27 VITALS
OXYGEN SATURATION: 100 % | HEART RATE: 58 BPM | RESPIRATION RATE: 13 BRPM | DIASTOLIC BLOOD PRESSURE: 74 MMHG | WEIGHT: 183.06 LBS | TEMPERATURE: 98 F | BODY MASS INDEX: 30.5 KG/M2 | SYSTOLIC BLOOD PRESSURE: 104 MMHG | HEIGHT: 65 IN

## 2024-11-27 DIAGNOSIS — K21.9 GASTROESOPHAGEAL REFLUX DISEASE, UNSPECIFIED WHETHER ESOPHAGITIS PRESENT: ICD-10-CM

## 2024-11-27 DIAGNOSIS — R07.9 CHEST PAIN, UNSPECIFIED TYPE: Primary | ICD-10-CM

## 2024-11-27 LAB
ALBUMIN SERPL-MCNC: 4.1 G/DL (ref 3.4–4.8)
ALBUMIN/GLOB SERPL: 1 RATIO (ref 1.1–2)
ALP SERPL-CCNC: 99 UNIT/L (ref 40–150)
ALT SERPL-CCNC: 21 UNIT/L (ref 0–55)
ANION GAP SERPL CALC-SCNC: 8 MEQ/L
AST SERPL-CCNC: 26 UNIT/L (ref 5–34)
BASOPHILS # BLD AUTO: 0.02 X10(3)/MCL
BASOPHILS NFR BLD AUTO: 0.3 %
BILIRUB SERPL-MCNC: 0.3 MG/DL
BNP BLD-MCNC: 129 PG/ML
BUN SERPL-MCNC: 23 MG/DL (ref 9.8–20.1)
CALCIUM SERPL-MCNC: 10 MG/DL (ref 8.4–10.2)
CHLORIDE SERPL-SCNC: 108 MMOL/L (ref 98–107)
CO2 SERPL-SCNC: 24 MMOL/L (ref 23–31)
CREAT SERPL-MCNC: 1 MG/DL (ref 0.55–1.02)
CREAT/UREA NIT SERPL: 23
EOSINOPHIL # BLD AUTO: 0.15 X10(3)/MCL (ref 0–0.9)
EOSINOPHIL NFR BLD AUTO: 2 %
ERYTHROCYTE [DISTWIDTH] IN BLOOD BY AUTOMATED COUNT: 14.3 % (ref 11.5–17)
GFR SERPLBLD CREATININE-BSD FMLA CKD-EPI: >60 ML/MIN/1.73/M2
GLOBULIN SER-MCNC: 4.2 GM/DL (ref 2.4–3.5)
GLUCOSE SERPL-MCNC: 98 MG/DL (ref 82–115)
HCT VFR BLD AUTO: 39 % (ref 37–47)
HGB BLD-MCNC: 13.4 G/DL (ref 12–16)
HOLD SPECIMEN: NORMAL
IMM GRANULOCYTES # BLD AUTO: 0.02 X10(3)/MCL (ref 0–0.04)
IMM GRANULOCYTES NFR BLD AUTO: 0.3 %
LYMPHOCYTES # BLD AUTO: 2.25 X10(3)/MCL (ref 0.6–4.6)
LYMPHOCYTES NFR BLD AUTO: 30.6 %
MCH RBC QN AUTO: 30.2 PG (ref 27–31)
MCHC RBC AUTO-ENTMCNC: 34.4 G/DL (ref 33–36)
MCV RBC AUTO: 88 FL (ref 80–94)
MONOCYTES # BLD AUTO: 0.69 X10(3)/MCL (ref 0.1–1.3)
MONOCYTES NFR BLD AUTO: 9.4 %
NEUTROPHILS # BLD AUTO: 4.22 X10(3)/MCL (ref 2.1–9.2)
NEUTROPHILS NFR BLD AUTO: 57.4 %
NRBC BLD AUTO-RTO: 0 %
PLATELET # BLD AUTO: 158 X10(3)/MCL (ref 130–400)
PMV BLD AUTO: 11.5 FL (ref 7.4–10.4)
POTASSIUM SERPL-SCNC: 4.4 MMOL/L (ref 3.5–5.1)
PROT SERPL-MCNC: 8.3 GM/DL (ref 5.8–7.6)
RBC # BLD AUTO: 4.43 X10(6)/MCL (ref 4.2–5.4)
SODIUM SERPL-SCNC: 140 MMOL/L (ref 136–145)
TROPONIN I SERPL-MCNC: <0.01 NG/ML (ref 0–0.04)
WBC # BLD AUTO: 7.35 X10(3)/MCL (ref 4.5–11.5)

## 2024-11-27 PROCEDURE — 94761 N-INVAS EAR/PLS OXIMETRY MLT: CPT

## 2024-11-27 PROCEDURE — 93005 ELECTROCARDIOGRAM TRACING: CPT

## 2024-11-27 PROCEDURE — 84484 ASSAY OF TROPONIN QUANT: CPT | Performed by: INTERNAL MEDICINE

## 2024-11-27 PROCEDURE — 83880 ASSAY OF NATRIURETIC PEPTIDE: CPT | Performed by: INTERNAL MEDICINE

## 2024-11-27 PROCEDURE — 80053 COMPREHEN METABOLIC PANEL: CPT | Performed by: INTERNAL MEDICINE

## 2024-11-27 PROCEDURE — 85025 COMPLETE CBC W/AUTO DIFF WBC: CPT | Performed by: INTERNAL MEDICINE

## 2024-11-27 PROCEDURE — 25000003 PHARM REV CODE 250: Performed by: INTERNAL MEDICINE

## 2024-11-27 PROCEDURE — 99285 EMERGENCY DEPT VISIT HI MDM: CPT | Mod: 25

## 2024-11-27 RX ORDER — ALUMINUM HYDROXIDE, MAGNESIUM HYDROXIDE, AND SIMETHICONE 1200; 120; 1200 MG/30ML; MG/30ML; MG/30ML
30 SUSPENSION ORAL ONCE
Status: COMPLETED | OUTPATIENT
Start: 2024-11-27 | End: 2024-11-27

## 2024-11-27 RX ORDER — POLYETHYLENE GLYCOL 3350, SODIUM SULFATE, SODIUM CHLORIDE, POTASSIUM CHLORIDE, SODIUM ASCORBATE, AND ASCORBIC ACID 7.5-2.691G
2000 KIT ORAL ONCE
Qty: 1 KIT | Refills: 0 | Status: SHIPPED | OUTPATIENT
Start: 2024-11-27 | End: 2024-11-27

## 2024-11-27 RX ORDER — NAPROXEN SODIUM 220 MG/1
324 TABLET, FILM COATED ORAL
Status: COMPLETED | OUTPATIENT
Start: 2024-11-27 | End: 2024-11-27

## 2024-11-27 RX ADMIN — ALUMINUM HYDROXIDE, MAGNESIUM HYDROXIDE, AND DIMETHICONE 30 ML: 200; 20; 200 SUSPENSION ORAL at 09:11

## 2024-11-27 RX ADMIN — ASPIRIN 81 MG CHEWABLE TABLET 324 MG: 81 TABLET CHEWABLE at 07:11

## 2024-11-27 NOTE — Clinical Note
"Leonora Mclaughlin" Aneudy Barnes was seen and treated in our emergency department on 11/27/2024.  She may return to work on 11/29/2024.       If you have any questions or concerns, please don't hesitate to call.      DAMIEN MORGAN    "

## 2024-11-28 NOTE — ED PROVIDER NOTES
Encounter Date: 2024  History from patient through      History     Chief Complaint   Patient presents with    Chest Pain     States  intermittent chest pain to entire chest radiating to back starting at 0400 this am.     HPI    Leonora Barnes is 64 y.o. female who  has a past medical history of Chronic constipation, GERD with esophagitis, Migraine without status migrainosus, not intractable, Mixed hyperlipidemia, RLS (restless legs syndrome), and SVT (supraventricular tachycardia). arrives in ER with c/o Chest Pain (States  intermittent chest pain to entire chest radiating to back starting at 0400 this am.)      Review of patient's allergies indicates:  No Known Allergies  Past Medical History:   Diagnosis Date    Chronic constipation     GERD with esophagitis     Migraine without status migrainosus, not intractable     Mixed hyperlipidemia     RLS (restless legs syndrome)     SVT (supraventricular tachycardia)      Past Surgical History:   Procedure Laterality Date    CARDIAC SURGERY       SECTION       Family History   Problem Relation Name Age of Onset    Asthma Mother      Hypertension Father       Social History     Tobacco Use    Smoking status: Never     Passive exposure: Never    Smokeless tobacco: Never   Substance Use Topics    Alcohol use: Not Currently    Drug use: Never     Review of Systems   Constitutional:  Negative for fever.   HENT:  Negative for trouble swallowing and voice change.    Eyes:  Negative for visual disturbance.   Respiratory:  Negative for cough and shortness of breath.    Cardiovascular:  Positive for chest pain.   Gastrointestinal:  Negative for abdominal pain, diarrhea and vomiting.   Genitourinary:  Negative for dysuria and hematuria.   Musculoskeletal:  Negative for back pain and gait problem.   Skin:  Negative for color change and rash.   Neurological:  Negative for headaches.   Psychiatric/Behavioral:  Negative for behavioral problems and  sleep disturbance.    All other systems reviewed and are negative.      Physical Exam     Initial Vitals [11/27/24 1924]   BP Pulse Resp Temp SpO2   117/71 (!) 56 17 97.8 °F (36.6 °C) 100 %      MAP       --         Physical Exam    Nursing note and vitals reviewed.  Constitutional: She appears well-developed and well-nourished. No distress.   HENT:   Head: Atraumatic.   Eyes: EOM are normal.   Neck: Neck supple.   Cardiovascular:  Normal rate, regular rhythm and normal heart sounds.           Pulmonary/Chest: Breath sounds normal. No respiratory distress. She has no wheezes. She has no rales.   Abdominal: Abdomen is soft. Bowel sounds are normal. She exhibits no distension. There is no abdominal tenderness. There is no rebound.   Musculoskeletal:         General: No edema. Normal range of motion.      Cervical back: Neck supple.     Neurological: She is alert and oriented to person, place, and time.   Skin: Skin is warm and dry.   Psychiatric: She has a normal mood and affect.         ED Course   Procedures  Orders Placed This Encounter    X-ray Chest AP Portable    Comprehensive metabolic panel    CBC auto differential    Troponin I    Brain natriuretic peptide    CBC with Differential    EXTRA TUBES    Light Blue Top Hold    Vital signs    Cardiac Monitoring - Adult    Oxygen Continuous    Pulse Oximetry Continuous    EKG 12-lead    Insert saline lock    aspirin chewable tablet 324 mg    aluminum-magnesium hydroxide-simethicone 200-200-20 mg/5 mL suspension 30 mL       Labs Reviewed   COMPREHENSIVE METABOLIC PANEL - Abnormal       Result Value    Sodium 140      Potassium 4.4      Chloride 108 (*)     CO2 24      Glucose 98      Blood Urea Nitrogen 23.0 (*)     Creatinine 1.00      Calcium 10.0      Protein Total 8.3 (*)     Albumin 4.1      Globulin 4.2 (*)     Albumin/Globulin Ratio 1.0 (*)     Bilirubin Total 0.3      ALP 99      ALT 21      AST 26      eGFR >60      Anion Gap 8.0      BUN/Creatinine Ratio 23      B-TYPE NATRIURETIC PEPTIDE - Abnormal    Natriuretic Peptide 129.0 (*)    CBC WITH DIFFERENTIAL - Abnormal    WBC 7.35      RBC 4.43      Hgb 13.4      Hct 39.0      MCV 88.0      MCH 30.2      MCHC 34.4      RDW 14.3      Platelet 158      MPV 11.5 (*)     Neut % 57.4      Lymph % 30.6      Mono % 9.4      Eos % 2.0      Basophil % 0.3      Lymph # 2.25      Neut # 4.22      Mono # 0.69      Eos # 0.15      Baso # 0.02      IG# 0.02      IG% 0.3      NRBC% 0.0     TROPONIN I - Normal    Troponin-I <0.010     CBC W/ AUTO DIFFERENTIAL    Narrative:     The following orders were created for panel order CBC auto differential.  Procedure                               Abnormality         Status                     ---------                               -----------         ------                     CBC with Differential[[id number]]       Abnormal            Final result                 Please view results for these tests on the individual orders.   EXTRA TUBES    Narrative:     The following orders were created for panel order EXTRA TUBES.  Procedure                               Abnormality         Status                     ---------                               -----------         ------                     Light Blue Top Hold[[id number]]                             Final result                 Please view results for these tests on the individual orders.   LIGHT BLUE TOP HOLD    Extra Tube Hold for add-ons.          ECG Results              EKG 12-lead (Preliminary result)  Result time 11/27/24 19:43:44      Wet Read by Nadeem Rubi MD (11/27/24 19:43:44, [facility], Emergency Medicine)    EKG Initial Reading: Independently Interpreted by Nadeem Rubi MD. independently as: No STEMI, Normal Sinus Rhythm, Rate 56 ,No Ectopy, Normal conduction, Normal ST Segments, Normal T Waves, Normal Axis,                                   Imaging Results              X-ray Chest AP Portable  (Preliminary result)  Result time 11/27/24 21:20:38      Wet Read by Nadeem Rubi MD (11/27/24 21:20:38, [facility], Emergency Medicine)    Chest One View:  Independently reviewed and/or interpreted by Nadeem Rubi MD.  No Focal Consolidation, No Acute Cardiopulmonary abnormality identified grossly.                                     Medications   aspirin chewable tablet 324 mg (324 mg Oral Given 11/27/24 1951)   aluminum-magnesium hydroxide-simethicone 200-200-20 mg/5 mL suspension 30 mL (30 mLs Oral Given 11/27/24 2129)     Medical Decision Making    Leonora Barnes is 64 y.o. female who  has a past medical history of Chronic constipation, GERD with esophagitis, Migraine without status migrainosus, not intractable, Mixed hyperlipidemia, RLS (restless legs syndrome), and SVT (supraventricular tachycardia). arrives in ER with c/o Chest Pain (States  intermittent chest pain to entire chest radiating to back starting at 0400 this am.)    Patient has history of coronary artery disease with a double bypass comes to the emergency room with complaint of chest pain which started this morning while she was lying down in the bed, she also has history of SVT.  She continues to have chest pain all day mainly across the chest and going into the throat so she decided to come to the emergency room now.    On arrival patient has the a normal EKG with sinus rhythm of 56.  She does not have any major abnormality on examination, I am going to do a cardiac workup on her since she has a history of coronary artery disease and re-evaluate.  Her vital signs are normal also.    Amount and/or Complexity of Data Reviewed  Labs: ordered. Decision-making details documented in ED Course.  Radiology: ordered and independent interpretation performed.    Risk  OTC drugs.  Risk Details: Echocardiogram 7/22/2024  Study Result    · Left Ventricle: The left ventricle is normal in size. Normal wall  thickness. There is normal systolic function. Biplane (2D) method of discs ejection fraction is 62%.  · Right Ventricle: Normal right ventricular cavity size. Systolic function is borderline low.  · Left Atrium: Left atrium is mildly dilated.  · Right Atrium: Right atrium is mildly dilated.  · Mitral Valve: There is mild regurgitation.  · Tricuspid Valve: There is mild regurgitation.  · Pulmonary Artery: The estimated pulmonary artery systolic pressure is 39 mmHg.  · IVC/SVC: Normal venous pressure at 3 mmHg.                 ED Course as of 11/27/24 2209 Wed Nov 27, 2024 2120 BP: 121/69 [GQ]   2120 Pulse(!): 55 [GQ]   2120 SpO2: 100 %  Patient's vital signs remained stable in the emergency room. [GQ]   2120 BNP(!): 129.0 [GQ]   2120 Troponin I: <0.010  Patient has been having chest pain since this morning but still her troponin is less than 0.010, and EKG does not show any acute abnormality.  Actually she has a perfectly normal EKG. [GQ]   2121 BUN(!): 23.0 [GQ]   2121 Creatinine: 1.00 [GQ]   2121 Patient has history of GERD, with reflux esophagitis.  She does not have any evidence of bleeding at this time, [GQ]   2122 BNP(!): 129.0  Her BNP is minimally abnormal but she does not have any signs of obvious congestive heart failure on examination and she is maintaining her oxygen saturation at 100% on room air with no distress.  With clear lung sounds. [GQ]   2124 Patient's ejection fraction as documented by an echocardiogram in 7/20/2024 is 62%.    I will advise her to continue taking her Protonix, I will give her a dose of Mylanta in the emergency room and I will let her go home with instruction to follow up with the cardiologist.  Continue taking her usual Protonix.  And avoid any food which causes more acid reflux.  I do not think that she is having any acute coronary problem going on at this time. [GQ]      ED Course User Index  [GQ] Nadeem Rubi MD                           Clinical Impression:  Final  diagnoses:  [R07.9] Chest pain, unspecified type (Primary)  [K21.9] Gastroesophageal reflux disease, unspecified whether esophagitis present          ED Disposition Condition    Discharge Stable          ED Prescriptions    None       Follow-up Information       Follow up With Specialties Details Why Contact Info    PMD  In 2 days      Cardiologist                 Nadeem Rubi MD  11/27/24 9074

## 2024-11-28 NOTE — DISCHARGE INSTRUCTIONS
Ivonne medicamentos según lo prescrito     Consulte a jackson médico de spenser en [place] o dos días para adiel evaluación adicional y tratamiento adicional según sea necesario.     Evite conducir o manejar mientras tejas medicamentos, ya que algunos medicamentos pueden causar somnolencia y pueden causar problemas.     El examen y el tratamiento que recibió en la [facility] fue por un problema urgente y NO SE PRETENDE HELLEN ATENCIÓN COMPLETA. Es importante que CONTINÚE con un médico primario para recibir atención continua. Si mark síntomas empeoran o NO MEJORA y no puede comunicarse con jackson proveedor de atención médica, debe REGRESAR al departamento de emergencia. El médico de la ren[facility] de emergencias le ha proporcionado adiel INTERPRETACIÓN PRELIMINAR de todos mark ESTUDIOS. Se puede hacer adiel interpretación final después de ser dado de chary. SI SE NECESITA UN CAMBIO en jackson diagnóstico o tratamiento, LE CONTACTAREMOS. Es fundamental que tengamos un NÚMERO DE TELÉFONO ACTUAL PARA USTED.

## 2024-12-02 LAB
OHS QRS DURATION: 92 MS
OHS QTC CALCULATION: 430 MS

## 2024-12-10 ENCOUNTER — ANESTHESIA EVENT (OUTPATIENT)
Dept: ENDOSCOPY | Facility: HOSPITAL | Age: 64
End: 2024-12-10
Payer: MEDICAID

## 2024-12-11 ENCOUNTER — HOSPITAL ENCOUNTER (OUTPATIENT)
Facility: HOSPITAL | Age: 64
Discharge: HOME | End: 2024-12-11
Attending: INTERNAL MEDICINE | Admitting: INTERNAL MEDICINE
Payer: MEDICAID

## 2024-12-11 ENCOUNTER — ANESTHESIA (OUTPATIENT)
Dept: ENDOSCOPY | Facility: HOSPITAL | Age: 64
End: 2024-12-11
Payer: MEDICAID

## 2024-12-11 ENCOUNTER — TELEPHONE (OUTPATIENT)
Dept: FAMILY MEDICINE | Facility: CLINIC | Age: 64
End: 2024-12-11
Payer: MEDICAID

## 2024-12-11 DIAGNOSIS — Z12.11 SCREEN FOR COLON CANCER: ICD-10-CM

## 2024-12-11 DIAGNOSIS — K59.09 CHRONIC CONSTIPATION: Primary | ICD-10-CM

## 2024-12-11 PROCEDURE — 37000008 HC ANESTHESIA 1ST 15 MINUTES: Performed by: INTERNAL MEDICINE

## 2024-12-11 PROCEDURE — 45385 COLONOSCOPY W/LESION REMOVAL: CPT | Performed by: INTERNAL MEDICINE

## 2024-12-11 PROCEDURE — 63600175 PHARM REV CODE 636 W HCPCS: Performed by: NURSE ANESTHETIST, CERTIFIED REGISTERED

## 2024-12-11 PROCEDURE — 37000009 HC ANESTHESIA EA ADD 15 MINS: Performed by: INTERNAL MEDICINE

## 2024-12-11 PROCEDURE — 27201423 OPTIME MED/SURG SUP & DEVICES STERILE SUPPLY: Performed by: INTERNAL MEDICINE

## 2024-12-11 PROCEDURE — 88305 TISSUE EXAM BY PATHOLOGIST: CPT | Mod: TC | Performed by: INTERNAL MEDICINE

## 2024-12-11 RX ORDER — LIDOCAINE HYDROCHLORIDE 10 MG/ML
1 INJECTION, SOLUTION EPIDURAL; INFILTRATION; INTRACAUDAL; PERINEURAL ONCE
Status: DISCONTINUED | OUTPATIENT
Start: 2024-12-11 | End: 2024-12-11 | Stop reason: HOSPADM

## 2024-12-11 RX ORDER — PROPOFOL 10 MG/ML
VIAL (ML) INTRAVENOUS
Status: DISCONTINUED | OUTPATIENT
Start: 2024-12-11 | End: 2024-12-11

## 2024-12-11 RX ORDER — LIDOCAINE HYDROCHLORIDE 20 MG/ML
INJECTION INTRAVENOUS
Status: DISCONTINUED | OUTPATIENT
Start: 2024-12-11 | End: 2024-12-11

## 2024-12-11 RX ORDER — SODIUM CHLORIDE, SODIUM LACTATE, POTASSIUM CHLORIDE, CALCIUM CHLORIDE 600; 310; 30; 20 MG/100ML; MG/100ML; MG/100ML; MG/100ML
INJECTION, SOLUTION INTRAVENOUS CONTINUOUS
Status: DISCONTINUED | OUTPATIENT
Start: 2024-12-11 | End: 2024-12-11 | Stop reason: HOSPADM

## 2024-12-11 RX ORDER — LUBIPROSTONE 24 UG/1
24 CAPSULE ORAL 2 TIMES DAILY WITH MEALS
Qty: 60 CAPSULE | Refills: 5 | Status: SHIPPED | OUTPATIENT
Start: 2024-12-11 | End: 2025-06-09

## 2024-12-11 RX ADMIN — PROPOFOL 20 MG: 10 INJECTION, EMULSION INTRAVENOUS at 11:12

## 2024-12-11 RX ADMIN — PROPOFOL 50 MG: 10 INJECTION, EMULSION INTRAVENOUS at 10:12

## 2024-12-11 RX ADMIN — PROPOFOL 20 MG: 10 INJECTION, EMULSION INTRAVENOUS at 10:12

## 2024-12-11 RX ADMIN — LIDOCAINE HYDROCHLORIDE 50 MG: 20 INJECTION INTRAVENOUS at 10:12

## 2024-12-11 NOTE — TRANSFER OF CARE
Anesthesia Transfer of Care Note    Patient: Leonora Barnes    Procedure(s) Performed: Procedure(s) (LRB):  COLONOSCOPY, WITH POLYPECTOMY USING SNARE (N/A)    Patient location: GI    Anesthesia Type: general    Post pain: adequate analgesia    Post assessment: no apparent anesthetic complications    Post vital signs: stable    Level of consciousness: awake    Nausea/Vomiting: no nausea/vomiting    Complications: none    Transfer of care protocol was followed      Last vitals:

## 2024-12-11 NOTE — PROVATION PATIENT INSTRUCTIONS
Discharge Summary/Instructions after an Endoscopic Procedure  Patient Name: Leonora Barnes  Patient MRN: [id number]  Patient   YOB: 1960 Wednesday, December 11, 2024  Nati Naidu MD  Dear patient,  As a result of recent federal legislation (The Federal Cures Act), you may   receive lab or pathology results from your procedure in your [facility]   account before your physician is able to contact you. Your physician or   their representative will relay the results to you with their   recommendations at their soonest availability.  Thank you,  RESTRICTIONS:  During your procedure today, you received medications for sedation.  These   medications may affect your judgment, balance and coordination.  Therefore,   for 24 hours, you have the following restrictions:   - DO NOT drive a car, operate machinery, make legal/financial decisions,   sign important papers or drink alcohol.    ACTIVITY:  Today: no heavy lifting, straining or running due to procedural   sedation/anesthesia.  The following day: return to full activity including work.  DIET:  Eat and drink normally unless instructed otherwise.     TREATMENT FOR COMMON SIDE EFFECTS:  - Mild abdominal pain, nausea, belching, bloating or excessive gas:  rest,   eat lightly and use a heating pad.  - Sore Throat: treat with throat lozenges and/or gargle with warm salt   water.  - Because air was used during the procedure, expelling large amounts of air   from your rectum or belching is normal.  - If a bowel prep was taken, you may not have a bowel movement for 1-3 days.    This is normal.  SYMPTOMS TO WATCH FOR AND REPORT TO YOUR PHYSICIAN:  1. Abdominal pain or bloating, other than gas cramps.  2. Chest pain.  3. Back pain.  4. Signs of infection such as: chills or fever occurring within 24 hours   after the procedure.  5. Rectal bleeding, which would show as bright red, maroon, or black stools.   (A tablespoon of blood from the rectum is not  serious, especially if   hemorrhoids are present.)  6. Vomiting.  7. Weakness or dizziness.  GO DIRECTLY TO THE NEAREST EMERGENCY ROOM IF YOU HAVE ANY OF THE FOLLOWING:      Difficulty breathing              Chills and/or fever over 101 F   Persistent vomiting and/or vomiting blood   Severe abdominal pain   Severe chest pain   Black, tarry stools   Bleeding- more than one tablespoon   Any other symptom or condition that you feel may need urgent attention  Your doctor recommends these additional instructions:  If any biopsies were taken, your doctors clinic will contact you in 1 to 2   weeks with any results.  - Patient has a contact number available for emergencies.  The signs and   symptoms of potential delayed complications were discussed with the   patient.  Return to normal activities tomorrow.  Written discharge   instructions were provided to the patient.   - Discharge patient to home.   - Resume previous diet.   - Continue present medications.   - Await pathology results.   - Repeat colonoscopy in 3 years because the bowel preparation was   suboptimal.   - Use Amitiza (lubiprostone) 24 mcg PO BID.  For questions, problems or results please call your physician - Nati Naidu MD at Work:  [phone], Work:  [phone].  [facility] , EMERGENCY ROOM PHONE NUMBER: [phone]  IF A COMPLICATION OR EMERGENCY SITUATION ARISES AND YOU ARE UNABLE TO REACH   YOUR PHYSICIAN - GO DIRECTLY TO THE EMERGENCY ROOM.  Nati Naidu MD  12/11/2024 11:32:53 AM  This report has been verified and signed electronically.  Dear patient,  As a result of recent federal legislation (The Federal Cures Act), you may   receive lab or pathology results from your procedure in your [facility]   account before your physician is able to contact you. Your physician or   their representative will relay the results to you with their   recommendations at their soonest availability.  Thank  you,  PROVATION

## 2024-12-11 NOTE — ANESTHESIA PREPROCEDURE EVALUATION
"                                                                                                             12/10/2024  Leonora Barnes is a 64 y.o., female with PMHx of obesity, HLD, SVT, GERD presents for screening colonoscopy.    Atenolol--last dose 12/11/24 @     Active Ambulatory Problems     Diagnosis Date Noted    Restless leg syndrome 05/08/2023    Pain in both lower extremities 05/08/2023    Chronic constipation 07/12/2023    Hematochezia 07/12/2023    SVT (supraventricular tachycardia) 08/09/2023    Migraine without status migrainosus, not intractable 08/09/2023    Mixed hyperlipidemia 08/09/2023    Atypical chest pain 04/26/2024    GERD without esophagitis 04/26/2024     Resolved Ambulatory Problems     Diagnosis Date Noted    No Resolved Ambulatory Problems     Past Medical History:   Diagnosis Date    GERD with esophagitis     RLS (restless legs syndrome)        Pre-op Assessment    I have reviewed the NPO Status.      Review of Systems  Anesthesia Hx:  No problems with previous Anesthesia                Social:  Non-Smoker       Cardiovascular:         Dysrhythmias       hyperlipidemia                               Pulmonary:  Pulmonary Normal                       Renal/:  Renal/ Normal                 Hepatic/GI:  Bowel Prep.   GERD, well controlled                Neurological:  Neurology Normal                                      Endocrine:        Obesity / BMI > 30    Vitals:    12/11/24 0842   BP: (!) 116/59   BP Location: Right arm   Patient Position: Lying   Pulse: 60   Resp: 18   Temp: 36.4 °C (97.6 °F)   TempSrc: Oral   SpO2: 99%   Weight: 81.6 kg (180 lb)   Height: 5' 5" (1.651 m)         Physical Exam  General: Alert, Cooperative and Well nourished    Airway:  Mallampati: II   Mouth Opening: Normal  TM Distance: Normal  Tongue: Normal  Neck ROM: Normal ROM    Dental:  Loose teeth    Chest/Lungs:  Clear to auscultation, Normal Respiratory Rate    Heart:  Rate: Normal  Rhythm: " Regular Rhythm  Sounds: Normal      Lab Results   Component Value Date    WBC 7.35 11/27/2024    HGB 13.4 11/27/2024    HCT 39.0 11/27/2024    MCV 88.0 11/27/2024     11/27/2024       CMP  Sodium   Date Value Ref Range Status   11/27/2024 140 136 - 145 mmol/L Final     Potassium   Date Value Ref Range Status   11/27/2024 4.4 3.5 - 5.1 mmol/L Final     Chloride   Date Value Ref Range Status   11/27/2024 108 (H) 98 - 107 mmol/L Final     CO2   Date Value Ref Range Status   11/27/2024 24 23 - 31 mmol/L Final     Blood Urea Nitrogen   Date Value Ref Range Status   11/27/2024 23.0 (H) 9.8 - 20.1 mg/dL Final     Creatinine   Date Value Ref Range Status   11/27/2024 1.00 0.55 - 1.02 mg/dL Final     Calcium   Date Value Ref Range Status   11/27/2024 10.0 8.4 - 10.2 mg/dL Final     Albumin   Date Value Ref Range Status   11/27/2024 4.1 3.4 - 4.8 g/dL Final   05/08/2023 4.0 g/dL Final     Bilirubin Total   Date Value Ref Range Status   11/27/2024 0.3 <=1.5 mg/dL Final     ALP   Date Value Ref Range Status   11/27/2024 99 40 - 150 unit/L Final     AST   Date Value Ref Range Status   11/27/2024 26 5 - 34 unit/L Final     ALT   Date Value Ref Range Status   11/27/2024 21 0 - 55 unit/L Final     eGFR   Date Value Ref Range Status   11/27/2024 >60 mL/min/1.73/m2 Final                 Anesthesia Plan  Type of Anesthesia, risks & benefits discussed:    Anesthesia Type: Gen Natural Airway  Intra-op Monitoring Plan: Standard ASA Monitors  Post Op Pain Control Plan: IV/PO Opioids PRN  Induction:  IV  Informed Consent: Informed consent signed with the Patient and all parties understand the risks and agree with anesthesia plan.  All questions answered.   ASA Score: 3  Day of Surgery Review of History & Physical: H&P Update referred to the surgeon/provider.    Ready For Surgery From Anesthesia Perspective.     .

## 2024-12-11 NOTE — TELEPHONE ENCOUNTER
Called patient via phone call and patient understands results given. No further questions at this time.     ----- Message from Kandy Castillo MD sent at 12/11/2024 12:43 PM CST -----  Please let the patient know that the autoimmune rheumatologic workup came back negative

## 2024-12-11 NOTE — PLAN OF CARE
Pt back to baseline. A&O X4. Follows commands. Tolerating oral fluids.performs self care and dressed self. Pt and family educated on post op care. Discharge instructions handout given to patient. All questions and concerns were addressed.

## 2024-12-11 NOTE — ANESTHESIA POSTPROCEDURE EVALUATION
Anesthesia Post Evaluation    Patient: Leonora Barnes    Procedure(s) Performed: Procedure(s) (LRB):  COLONOSCOPY, WITH POLYPECTOMY USING SNARE (N/A)    Final Anesthesia Type: general      Patient location during evaluation: GI PACU  Patient participation: Yes- Able to Participate  Level of consciousness: awake and alert  Pain management: adequate  Airway patency: patent      Anesthetic complications: no      Cardiovascular status: hemodynamically stable  Respiratory status: unassisted, room air and spontaneous ventilation  Hydration status: euvolemic  Follow-up not needed.              No case tracking events are documented in the log.      Pain/Lizette Score: No data recorded

## 2024-12-11 NOTE — H&P
Colonoscopy History and Physical    Patient Name: Leonora Barnes  MRN: [id number]  : 1960  Date of Procedure:  2024  Referring Physician: Kandy Castillo MD  Primary Physician: Kandy Castillo MD  Procedure Physician: Nati Naidu MD, MPH     Procedure - Colonoscopy  ASA - per anesthesia  Mallampati - per anesthesia  History of Anesthesia problems - no  Family history Anesthesia problems -  no   Plan of anesthesia - General    Diagnosis: constipation  Chief Complaint: Same as above    HPI: Patient is an 64 y.o. female is here for the above.     Ms. Aneudy Barnes is a 64-year-old  female here for a colonoscopy for constipation.      used throughout patient visit.  She reports a longstanding history of chronic constipation that has worsened over time.  She has one bowel movement every 2-3 days.  She doesn't always feel completely evacuated.  She reports frequent straining when trying to have a bowel movement and will have to sit for an extended period of time.  She has occasional red blood with bowel movements noted on the tissue after wiping that is infrequent and which she associates with hemorrhoidal disease.  She denies melena.  Her appetite is good and her weight is stable.  She denies nocturnal symptoms, nausea, vomiting, hematemesis, odynophagia, dysphagia, acid reflux, pyrosis, or early satiety.  She will experience occasional abdominal pain with constipation that is mostly relieved with defecation.  She takes MiraLAX 17 g prn with minimal relief noted.  She takes magnesium prn.        She had an EGD done approximately 25 years ago with findings of gastric ulcers. She had a colonoscopy done approximately 9 years ago in [place] which was unremarkable. She denies regular NSAID use or use of blood thinners. She denies tobacco or alcohol use. She denies a family history of IBD, colon polyps, or colon cancer.    Last colonoscopy: 9 years ago   Family  history: denies  Anticoagulation: none    ROS:  Constitutional: No fevers, chills, No weight loss  CV: No chest pain  Pulm: No cough, No shortness of breath  GI: see HPI    Medical History:   Past Medical History:   Diagnosis Date    Chronic constipation     GERD with esophagitis     Migraine without status migrainosus, not intractable     Mixed hyperlipidemia     RLS (restless legs syndrome)     SVT (supraventricular tachycardia)          Surgical History:   Past Surgical History:   Procedure Laterality Date    CARDIAC SURGERY       SECTION         Family History:   Family History   Problem Relation Name Age of Onset    Asthma Mother      Hypertension Father     .    Social History:   Social History     Socioeconomic History    Marital status:    Tobacco Use    Smoking status: Never     Passive exposure: Never    Smokeless tobacco: Never   Substance and Sexual Activity    Alcohol use: Not Currently    Drug use: Never    Sexual activity: Not Currently     Partners: Male       Review of patient's allergies indicates:  No Known Allergies    Medications:   Medications Prior to Admission   Medication Sig Dispense Refill Last Dose/Taking    gabapentin (NEURONTIN) 300 MG capsule Take 1 capsule (300 mg total) by mouth 2 (two) times daily. 60 capsule 2 Past Week    atenoloL (TENORMIN) 25 MG tablet Take 1 tablet (25 mg total) by mouth once daily. 30 tablet 2 Unknown    methocarbamoL (ROBAXIN) 500 MG Tab Take 1 tablet (500 mg total) by mouth 2 (two) times daily as needed (pain/spasm). 60 tablet 2 Unknown    nitroGLYCERIN (NITROSTAT) 0.4 MG SL tablet Place 1 tablet (0.4 mg total) under the tongue every 5 (five) minutes as needed for Chest pain. 25 tablet 3 Unknown    pantoprazole (PROTONIX) 20 MG tablet Take 1 tablet (20 mg total) by mouth once daily. 30 tablet 5 Unknown    sumatriptan (IMITREX) 100 MG tablet TAKE 1 TABLET BY MOUTH AS NEEDED FOR MIGRAINE. CAN REPEAT DOSE IN 2 HOURS IF NECESSARY. MAX OF 2 TABLETS  "A DAY 9 tablet 0 Unknown         Physical Exam:    Vital Signs:   Vitals:    12/11/24 0842   BP: (!) 116/59   Pulse: 60   Resp: 18   Temp: 97.6 °F (36.4 °C)     BP (!) 116/59 (BP Location: Right arm, Patient Position: Lying)   Pulse 60   Temp 97.6 °F (36.4 °C) (Oral)   Resp 18   Ht 5' 5" (1.651 m)   Wt 81.6 kg (180 lb)   SpO2 99%   Breastfeeding No   BMI 29.95 kg/m²     General:          Well appearing in no acute distress  Lungs: Clear to auscultation bilaterally, respirations unlabored  Heart: Regular rate and rhythm, S1 and S2 normal, no obvious murmurs  Abdomen:         Soft, non-tender, bowel sounds normal, no masses, no organomegaly        Labs:  Lab Results   Component Value Date    WBC 7.35 11/27/2024    HGB 13.4 11/27/2024    HCT 39.0 11/27/2024    MCV 88.0 11/27/2024     11/27/2024     Lab Results   Component Value Date    INR 0.98 05/25/2020    APTT 30.0 05/25/2020     Lab Results   Component Value Date     11/27/2024    K 4.4 11/27/2024    CO2 24 11/27/2024    BUN 23.0 (H) 11/27/2024    CREATININE 1.00 11/27/2024    LABPROT 8.3 (H) 11/27/2024    ALBUMIN 4.1 11/27/2024    BILITOT 0.3 11/27/2024    ALKPHOS 99 11/27/2024    ALT 21 11/27/2024    AST 26 11/27/2024         Assessment and Plan:    History reviewed, vital signs satisfactory, cardiopulmonary status satisfactory.  I have explained the sedation options, risks, benefits, and alternatives of this endoscopic procedure to the patient including but not limited to bleeding, inflammation, infection, perforation, and death.  All questions were answered and the patient consented to proceed with procedure as planned.   The patient is deemed an appropriate candidate for the sedation as planned.      Nati Naidu MD, MPH   of Clinical Medicine  Gastroenterology and Hepatology  [facility]    12/11/2024  8:44 AM     "

## 2024-12-12 ENCOUNTER — HOSPITAL ENCOUNTER (OUTPATIENT)
Dept: RADIOLOGY | Facility: HOSPITAL | Age: 64
Discharge: HOME | End: 2024-12-12
Attending: STUDENT IN AN ORGANIZED HEALTH CARE EDUCATION/TRAINING PROGRAM
Payer: MEDICAID

## 2024-12-12 ENCOUNTER — OFFICE VISIT (OUTPATIENT)
Dept: ORTHOPEDICS | Facility: CLINIC | Age: 64
End: 2024-12-12
Payer: MEDICAID

## 2024-12-12 VITALS
SYSTOLIC BLOOD PRESSURE: 119 MMHG | DIASTOLIC BLOOD PRESSURE: 66 MMHG | HEIGHT: 65 IN | TEMPERATURE: 98 F | BODY MASS INDEX: 30.16 KG/M2 | WEIGHT: 181 LBS | HEART RATE: 62 BPM

## 2024-12-12 VITALS
DIASTOLIC BLOOD PRESSURE: 79 MMHG | HEART RATE: 48 BPM | SYSTOLIC BLOOD PRESSURE: 125 MMHG | WEIGHT: 180 LBS | OXYGEN SATURATION: 98 % | RESPIRATION RATE: 16 BRPM | TEMPERATURE: 98 F | HEIGHT: 65 IN | BODY MASS INDEX: 29.99 KG/M2

## 2024-12-12 DIAGNOSIS — M75.102 ROTATOR CUFF SYNDROME OF LEFT SHOULDER: Primary | ICD-10-CM

## 2024-12-12 DIAGNOSIS — M75.102 ROTATOR CUFF SYNDROME OF LEFT SHOULDER: ICD-10-CM

## 2024-12-12 DIAGNOSIS — M54.12 CERVICAL RADICULOPATHY: ICD-10-CM

## 2024-12-12 LAB
ESTROGEN SERPL-MCNC: NORMAL PG/ML
INSULIN SERPL-ACNC: NORMAL U[IU]/ML
LAB AP CLINICAL INFORMATION: NORMAL
LAB AP GROSS DESCRIPTION: NORMAL
LAB AP REPORT FOOTNOTES: NORMAL
T3RU NFR SERPL: NORMAL %

## 2024-12-12 PROCEDURE — 99214 OFFICE O/P EST MOD 30 MIN: CPT | Mod: PBBFAC,25 | Performed by: STUDENT IN AN ORGANIZED HEALTH CARE EDUCATION/TRAINING PROGRAM

## 2024-12-12 PROCEDURE — 20611 DRAIN/INJ JOINT/BURSA W/US: CPT | Mod: PBBFAC | Performed by: STUDENT IN AN ORGANIZED HEALTH CARE EDUCATION/TRAINING PROGRAM

## 2024-12-12 PROCEDURE — 73030 X-RAY EXAM OF SHOULDER: CPT | Mod: TC,LT

## 2024-12-12 RX ORDER — LIDOCAINE HYDROCHLORIDE 10 MG/ML
5 INJECTION, SOLUTION EPIDURAL; INFILTRATION; INTRACAUDAL; PERINEURAL
Status: COMPLETED | OUTPATIENT
Start: 2024-12-12 | End: 2024-12-12

## 2024-12-12 RX ORDER — TRIAMCINOLONE ACETONIDE 40 MG/ML
40 INJECTION, SUSPENSION INTRA-ARTICULAR; INTRAMUSCULAR ONCE
Status: COMPLETED | OUTPATIENT
Start: 2024-12-12 | End: 2024-12-12

## 2024-12-12 RX ADMIN — LIDOCAINE HYDROCHLORIDE 50 MG: 10 INJECTION, SOLUTION EPIDURAL; INFILTRATION; INTRACAUDAL; PERINEURAL at 12:12

## 2024-12-12 RX ADMIN — TRIAMCINOLONE ACETONIDE 40 MG: 40 INJECTION, SUSPENSION INTRA-ARTICULAR; INTRAMUSCULAR at 12:12

## 2024-12-13 NOTE — PROGRESS NOTES
Faculty Attestation: Leonora Barnes  was seen in Sports Medicine Clinic Patient seen and evaluated at the time of the visit. History of Present Illness, Physical Exam, and Assessment and Plan reviewed.     Treatment plan is reasonable and appropriate. Compliance with treatment recommendations is important.      Radiology images independently reviewed and agree with fellow interpretation.     Procedure note reviewed. Present for entire procedure with the fellow. Patient tolerated procedure well.     Matti Simon MD  Sports Medicine

## 2025-01-13 DIAGNOSIS — G43.809 OTHER MIGRAINE WITHOUT STATUS MIGRAINOSUS, NOT INTRACTABLE: ICD-10-CM

## 2025-01-13 RX ORDER — SUMATRIPTAN SUCCINATE 100 MG/1
TABLET ORAL
Qty: 9 TABLET | Refills: 2 | Status: SHIPPED | OUTPATIENT
Start: 2025-01-13 | End: 2026-01-13

## 2025-01-27 NOTE — PROGRESS NOTES
"Subjective:    Patient ID: Leonora Barnes is a right handed 64 y.o. female  who presented to [facility] for follow up..      Chief Complaint: Pain of the Left Shoulder      History of Present Illness:  Leonora Barnes is a 64-year-old female for the skin continue to follow up on her left shoulder pain that has been going on for more than 2 months.  She reports that the pain is worse whenever she tries to reach overhead such as doing her hair or reaching into cupboards..  She rates the pain as a 7/10 worse with he is doing the above her head.  Describes it as achy.   I previously saw the patient and gave her a subacromial and subdeltoid due to injection which did help her with the pain 2 months ago.  Since that time, she has not done any formal physical therapy, has not done any home exercises.  She is taking oral diclofenac and gabapentin without significant relief.    Shoulder Review of Systems:  Swelling?  no  Instability?  no  Clicking?  no  Limited ROM? no  Fever/Chills? no  Subluxation? no  Dislocation? no       Objective:      Physical Exam:    /70 (Patient Position: Sitting)   Pulse 60   Temp 97.6 °F (36.4 °C)   Ht 5' 5" (1.651 m)   Wt 82.9 kg (182 lb 12.2 oz)   BMI 30.41 kg/m²     Ortho/SPM Exam    Appearance:  Soft tissue swelling: Left: no Right: no  Effusion: Left:  Negative Right: Negative  Erythema: Left no Right: no  Ecchymosis: Left: no Right: no  Atrophy: Left: no Right: no  Scapular winging: Left: no Right: no    Palpation:  Shoulder Tenderness: Left: biceps tendon, deltoid muscle  Right: none    Range of motion:  Flexion (0-90): Left:  180 Right: 180  Abduction (0-180): Left:  180 Right: 180  External rotation (0-55): Left: 55 Right: 55  Internal rotation (0-45): Left: 45 Right: 45    Strength:  Abduction: Left: 5/5 Pain: yes Right: 5/5 Pain: no  External rotation: Left: 5/5 Pain: yes Right: 5/5 Pain: no  Internal " rotation: Left: 5/5 Pain: no Right: 5/5 Pain: no  Elbow flexion: Left: 5/5 Pain: no Right: 5/5 Pain: no  Elbow extension: Left: 5/5 Pain: no Right: 5/5 Pain: no    Special Tests:  Subacromial Impingement  Neer: Left: Negative Right: Negative  Triana: Left: Negative Right: Negative    AC Joint Arthritis:  Cross-body abduction: Left: Positive Right: Negative    Rotator Cuff Tear   Drop arm test: Left: Negative Right: Negative  Hornblower: Left: Left: Not performed Right: Not performed   Belly press test: Left: Negative Right: Negative  Miladis test (Empty can): Left: Positive Right: Negative    Biceps tendon/Labral tear  Speed's: Left: Negative Right: Negative  Yergason's: Left: Negative Right: Negative      Cervical   Spurling: Left: Negative Right: Negative    AIN/PIN/Ulnar nerve: Intact and symmetric    General appearance: NAD  Peripheral pulses: normal bilaterally   Reflexes: Left: Not performed Right: Not performed   Sensation: normal    Labs:  Last A1c: 5.4     Imaging:   Previous images reviewed.  From 12/12/2024  X-rays ordered and performed today: no  # of views: 4 Laterality: left  My Interpretation:  Mild degenerative changes noted at the AC joint.  No fractures or dislocations noted.      Assessment:   Dx:  Rotator cuff syndrome, left, moderate exacerbation  Treatment Plan: Discussed with patient diagnosis and treatment recommendations. Handout given. Recommend conservative treatment to include: avoidance of aggravating activity, significant modification of daily activities, hot/cold therapies, topical and oral medications, braces, HEP/PT/OT, and injections.   Patient with rotator cuff syndrome on the left side.  She had improvement with her previous corticosteroid injection.  Since that time, she has not done any home exercise program or physical therapy.  She does have some mild relief with oral medications.  We will send patient to formal physical therapy and provide patient with home exercise program in  [place].  Patient can he can not using topical and oral medications for pain relief.  We will see patient back in 2 months for re-evaluation.    Imaging: prior radiological studies independently reviewed; discussed with patient; agree with radiologist interpretation.   Procedure: Discussed CSI as treatment options; discussed injections as treatment options in future if conservative measures do not improve symptoms.  Therapy: Physical Therapy  Medication: CONTINUE previously prescribed medication diclofenac 75mg BID  CONTINUE Voltaren Gel 1% as prescribed. Please see your primary care physician for further refills.  RTC: 2 months         Procedures    Cosme Perez D.O.  Sports Medicine Fellow

## 2025-01-28 ENCOUNTER — OFFICE VISIT (OUTPATIENT)
Dept: ORTHOPEDICS | Facility: CLINIC | Age: 65
End: 2025-01-28
Payer: MEDICAID

## 2025-01-28 VITALS
HEART RATE: 60 BPM | TEMPERATURE: 98 F | SYSTOLIC BLOOD PRESSURE: 107 MMHG | BODY MASS INDEX: 30.45 KG/M2 | DIASTOLIC BLOOD PRESSURE: 70 MMHG | WEIGHT: 182.75 LBS | HEIGHT: 65 IN

## 2025-01-28 DIAGNOSIS — M75.102 ROTATOR CUFF SYNDROME OF LEFT SHOULDER: Primary | ICD-10-CM

## 2025-01-28 PROCEDURE — 99213 OFFICE O/P EST LOW 20 MIN: CPT | Mod: PBBFAC | Performed by: STUDENT IN AN ORGANIZED HEALTH CARE EDUCATION/TRAINING PROGRAM

## 2025-01-28 RX ORDER — DICLOFENAC SODIUM 75 MG/1
75 TABLET, DELAYED RELEASE ORAL 2 TIMES DAILY
Qty: 60 TABLET | Refills: 0 | Status: SHIPPED | OUTPATIENT
Start: 2025-01-28 | End: 2025-02-27

## 2025-01-28 RX ORDER — DICLOFENAC SODIUM 10 MG/G
2 GEL TOPICAL 4 TIMES DAILY
Qty: 100 G | Refills: 3 | Status: SHIPPED | OUTPATIENT
Start: 2025-01-28

## 2025-02-05 ENCOUNTER — TELEPHONE (OUTPATIENT)
Dept: ORTHOPEDICS | Facility: CLINIC | Age: 65
End: 2025-02-05

## 2025-02-05 DIAGNOSIS — M75.102 ROTATOR CUFF SYNDROME OF LEFT SHOULDER: ICD-10-CM

## 2025-02-05 DIAGNOSIS — M25.50 POLYARTHRALGIA: ICD-10-CM

## 2025-02-05 RX ORDER — METHOCARBAMOL 500 MG/1
TABLET, FILM COATED ORAL
Qty: 60 TABLET | Refills: 2 | Status: SHIPPED | OUTPATIENT
Start: 2025-02-05

## 2025-02-05 NOTE — TELEPHONE ENCOUNTER
Per Nadia in the front, Patient needs a new referral to be sent in for EMG, this time to [facility]. Reason being Dr Browne's office does not use translators. Please make write new referral so I can send it out. Thanks.

## 2025-02-20 ENCOUNTER — TELEPHONE (OUTPATIENT)
Dept: ORTHOPEDICS | Facility: CLINIC | Age: 65
End: 2025-02-20
Payer: MEDICAID

## 2025-02-20 DIAGNOSIS — M54.12 CERVICAL RADICULOPATHY: Primary | ICD-10-CM

## 2025-02-20 NOTE — TELEPHONE ENCOUNTER
Patient needs a new referral for EMG to be sent to [facility] due to Dr. Browne's office not able to use translation services. Please put in a new referral. And let me know when you send it. Thanks.

## 2025-02-26 ENCOUNTER — TELEPHONE (OUTPATIENT)
Dept: ORTHOPEDICS | Facility: CLINIC | Age: 65
End: 2025-02-26
Payer: MEDICAID

## 2025-02-26 NOTE — TELEPHONE ENCOUNTER
Patient called asking for us to send the PT rx to [facility] in [place]      Order and demographic fax to [facility]

## 2025-03-05 DIAGNOSIS — G43.809 OTHER MIGRAINE WITHOUT STATUS MIGRAINOSUS, NOT INTRACTABLE: ICD-10-CM

## 2025-03-05 RX ORDER — DICLOFENAC SODIUM 10 MG/G
2 GEL TOPICAL 4 TIMES DAILY
Qty: 100 G | Refills: 3 | Status: SHIPPED | OUTPATIENT
Start: 2025-03-05

## 2025-03-06 RX ORDER — SUMATRIPTAN SUCCINATE 100 MG/1
TABLET ORAL
Qty: 9 TABLET | Refills: 2 | Status: SHIPPED | OUTPATIENT
Start: 2025-03-06 | End: 2026-03-05

## 2025-04-01 DIAGNOSIS — G43.809 OTHER MIGRAINE WITHOUT STATUS MIGRAINOSUS, NOT INTRACTABLE: ICD-10-CM

## 2025-04-02 RX ORDER — SUMATRIPTAN SUCCINATE 100 MG/1
TABLET ORAL
Qty: 9 TABLET | Refills: 8 | Status: SHIPPED | OUTPATIENT
Start: 2025-04-02 | End: 2026-03-31

## 2025-04-25 ENCOUNTER — PROCEDURE VISIT (OUTPATIENT)
Dept: NEUROLOGY | Facility: CLINIC | Age: 65
End: 2025-04-25
Payer: MEDICAID

## 2025-04-25 DIAGNOSIS — M54.12 CERVICAL RADICULOPATHY: Primary | ICD-10-CM

## 2025-04-25 DIAGNOSIS — M54.12 CERVICAL RADICULOPATHY: ICD-10-CM

## 2025-04-25 NOTE — PROCEDURES
Nerve conductions / EMG performed and full report scanned in chart. (Media tab)   summary comments, if any:   ..LUE NCVs and limited EMG ok   Poor relaxation for needle exam   Used iPad    Hopeful reassurance attempted         Hilario Elliott MD                   show

## 2025-04-30 ENCOUNTER — TELEPHONE (OUTPATIENT)
Dept: INTERNAL MEDICINE | Facility: CLINIC | Age: 65
End: 2025-04-30
Payer: MEDICAID

## 2025-04-30 NOTE — TELEPHONE ENCOUNTER
Placed call to patient using [employer],  [phone] Syl, Patient was informed that EMG is negative for carpal tunnel. Patient verbalized understanding.

## 2025-04-30 NOTE — TELEPHONE ENCOUNTER
----- Message from SUNI Benz sent at 4/30/2025 12:23 PM CDT -----  Regarding: EMG results  Please inform Leonora Barnes of the following: (Use )EMG study was normal. No sign of carpal tunnel.Thank you,JANIE HilliardC

## 2025-05-06 ENCOUNTER — OFFICE VISIT (OUTPATIENT)
Dept: GYNECOLOGY | Facility: CLINIC | Age: 65
End: 2025-05-06
Payer: MEDICAID

## 2025-05-06 VITALS
BODY MASS INDEX: 30.82 KG/M2 | HEART RATE: 59 BPM | WEIGHT: 185 LBS | SYSTOLIC BLOOD PRESSURE: 114 MMHG | DIASTOLIC BLOOD PRESSURE: 76 MMHG | RESPIRATION RATE: 18 BRPM | TEMPERATURE: 98 F | OXYGEN SATURATION: 100 % | HEIGHT: 65 IN

## 2025-05-06 DIAGNOSIS — Z01.419 WOMEN'S ANNUAL ROUTINE GYNECOLOGICAL EXAMINATION: Primary | ICD-10-CM

## 2025-05-06 DIAGNOSIS — Z78.0 POSTMENOPAUSAL: ICD-10-CM

## 2025-05-06 DIAGNOSIS — Z13.820 SCREENING FOR OSTEOPOROSIS: ICD-10-CM

## 2025-05-06 DIAGNOSIS — Z12.31 SCREENING MAMMOGRAM FOR BREAST CANCER: ICD-10-CM

## 2025-05-06 PROCEDURE — 3078F DIAST BP <80 MM HG: CPT | Mod: CPTII,,, | Performed by: NURSE PRACTITIONER

## 2025-05-06 PROCEDURE — 99215 OFFICE O/P EST HI 40 MIN: CPT | Mod: PBBFAC | Performed by: NURSE PRACTITIONER

## 2025-05-06 PROCEDURE — 88174 CYTOPATH C/V AUTO IN FLUID: CPT | Performed by: NURSE PRACTITIONER

## 2025-05-06 PROCEDURE — 87624 HPV HI-RISK TYP POOLED RSLT: CPT | Performed by: NURSE PRACTITIONER

## 2025-05-06 PROCEDURE — 99396 PREV VISIT EST AGE 40-64: CPT | Mod: S$PBB,,, | Performed by: NURSE PRACTITIONER

## 2025-05-06 PROCEDURE — 1160F RVW MEDS BY RX/DR IN RCRD: CPT | Mod: CPTII,,, | Performed by: NURSE PRACTITIONER

## 2025-05-06 PROCEDURE — 3008F BODY MASS INDEX DOCD: CPT | Mod: CPTII,,, | Performed by: NURSE PRACTITIONER

## 2025-05-06 PROCEDURE — 1159F MED LIST DOCD IN RCRD: CPT | Mod: CPTII,,, | Performed by: NURSE PRACTITIONER

## 2025-05-06 PROCEDURE — 3074F SYST BP LT 130 MM HG: CPT | Mod: CPTII,,, | Performed by: NURSE PRACTITIONER

## 2025-05-06 NOTE — PROGRESS NOTES
"Patient ID: Leonora Barnes is a 65 y.o. female.    Chief Complaint: Gynecologic Exam      Review of patient's allergies indicates:   Allergen Reactions    Aspirin Nausea And Vomiting         Past Medical History:   Diagnosis Date    Chronic constipation     Chronic constipation     GERD with esophagitis     Migraine without status migrainosus, not intractable     Mixed hyperlipidemia     RLS (restless legs syndrome)     Rotator cuff syndrome of left shoulder     SVT (supraventricular tachycardia)       HPI:  Pt is  here for annual gyn exam. Denies hx of abnormal pap. Last pap 3/30/2022-NIL; HPV 16/18/45 negative. Pt is postmenopausal since her early 50s. Denies vaginal bleeding/discharge. Denies pain. NO gyn complaints. States is not sexually active. Denies fly hx of breast, ovarian, uterine, or colon cancer. Pt is nonsmoker. Pt states was told that she had osteoporosis approximately 8 years ago. Denies every receiving treatment. Denies personal hx of fracture. Denies fly hx of osteoporosis. Pt denies chronic steroid therapy use. Pt is [place] speaking. Communicated with pt via language line using the i-pad  Jeevan #[id number]  PMHx Migraines, HTN, HLD  PSHx csection x 2; heart surgery (unable to specify)     Review of Systems:   Negative except for findings in HPI     Objective:   /76   Pulse (!) 59   Temp 97.9 °F (36.6 °C) (Oral)   Resp 18   Ht 5' 5" (1.651 m)   Wt 83.9 kg (185 lb)   SpO2 100%   BMI 30.79 kg/m²    Physical Exam:  General: Alert and oriented, No acute distress.  Breast: No mass, No tenderness, No discharge.   Gastrointestinal: Soft, Non-tender.vertical scar  Gynecology:  Labia: Bilateral, Majora, Minora, Within normal limits.  Vagina: pale; dry mucosa  Cervix: No cervical motion tenderness, No lesions.  Uterus: appears partially adhered to abdominal wall, Not tender.  Ovaries: Not tender, No mass.  Integumentary: Warm, Dry.  Neurologic: Alert, " Oriented.  Psychiatric: Cooperative, Appropriate mood & affect.    Assessment:   Women's annual routine gynecological examination  -     Liquid-Based Pap Smear, Screening    Screening mammogram for breast cancer  -     Mammo Digital Screening Bilat w/ Scott (XPD); Future; Expected date: 08/06/2025    Postmenopausal  -     DXA Bone Density Axial Skeleton 1 or more sites; Future; Expected date: 11/06/2025    Screening for osteoporosis  -     DXA Bone Density Axial Skeleton 1 or more sites; Future; Expected date: 11/06/2025            1. Women's annual routine gynecological examination    2. Screening mammogram for breast cancer    3. Postmenopausal    4. Screening for osteoporosis             -Contact clinic with any vaginal bleeding as this would be abnormal in postmenopausal pt. Encouraged dietary or supplemental intake of calcium 600mg +Vit D 400 IU twice daily for osteoporosis prevention.    Plan:       Follow up in about 1 year (around 5/6/2026).

## 2025-05-09 LAB
HIGH RISK HPV: NEGATIVE
PSYCHE PATHOLOGY RESULT: NORMAL

## 2025-05-19 RX ORDER — DICLOFENAC SODIUM 10 MG/G
2 GEL TOPICAL 4 TIMES DAILY
Qty: 100 G | Refills: 3 | Status: CANCELLED | OUTPATIENT
Start: 2025-05-19

## 2025-05-21 RX ORDER — DICLOFENAC SODIUM 10 MG/G
2 GEL TOPICAL 4 TIMES DAILY
Qty: 100 G | Refills: 3 | Status: SHIPPED | OUTPATIENT
Start: 2025-05-21

## 2025-06-03 NOTE — PROGRESS NOTES
"Subjective:    Patient ID: Leonora Barnes is a right handed 65 y.o. female  who presented to [facility] for follow up..      Chief Complaint: Pain of the Left Shoulder      History of Present Illness:  Leonora Barnes is a 65-year-old female who presents to the clinic today for re-evaluation of left shoulder pain.  She previously received a subacromial subdeltoid injection about 5 months ago which only lasted about 1 week.  She reports that she has been doing her home exercises and using oral topical medication without any improvement in her symptoms.  She reports her pain today as a 10/10 worse with reaching out overhead.  She describes the pain as achy.  She denies any trauma and denied any radiation of symptoms down her arm.  She has not done any formal physical therapy but has been doing exercises at home.  She has never had an MRI of the shoulder.    Shoulder Review of Systems:  Swelling?  no  Instability?  no  Clicking?  no  Limited ROM? no  Fever/Chills? no  Subluxation? no  Dislocation? no         Objective:      Physical Exam:    /78 (Patient Position: Sitting)   Pulse (!) 59   Temp 97.6 °F (36.4 °C)   Ht 5' 5" (1.651 m)   Wt 83.2 kg (183 lb 6.4 oz)   BMI 30.52 kg/m²     Ortho/SPM Exam    Appearance:  Soft tissue swelling: Left: no Right: no  Effusion: Left:  Negative Right: Negative  Erythema: Left no Right: no  Ecchymosis: Left: no Right: no  Atrophy: Left: no Right: no  Scapular winging: Left: no Right: no    Palpation:  Shoulder Tenderness: Left: proximal humerus  Right: none    Range of motion:  Flexion (0-90): Left:  180 Right: 180  Abduction (0-180): Left:  180 Right: 180  External rotation (0-55): Left: 55 Right: 55  Internal rotation (0-45): Left: 45 Right: 45    Strength:  Abduction: Left: 5/5 Pain: yes Right: 5/5 Pain: no  External rotation: Left: 5/5 Pain: yes Right: 5/5 Pain: no  Internal rotation: Left: 5/5 Pain: " no Right: 5/5 Pain: no  Elbow flexion: Left: 5/5 Pain: no Right: 5/5 Pain: no  Elbow extension: Left: 5/5 Pain: no Right: 5/5 Pain: no    Special Tests:  Subacromial Impingement  Neer: Left: Positive Right: Negative  Triana: Left: Positive Right: Negative    AC Joint Arthritis:  Cross-body abduction: Left: Negative Right: Negative    Rotator Cuff Tear   Drop arm test: Left: Negative Right: Negative  Hornblower: Left: Left: Not performed Right: Not performed   Belly press test: Left: Negative Right: Negative  Miladis test (Empty can): Left: Positive Right: Negative    Biceps tendon/Labral tear  Speed's: Left: Negative Right: Negative  Yergason's: Left: Negative Right: Negative  O'Juan Carlos's: Left: Negative Right: Negative  Cranck test: Left: Negative Right: Negative    Cervical   Spurling: Left: Negative Right: Negative    AIN/PIN/Ulnar nerve: Intact and symmetric    General appearance: NAD  Peripheral pulses: normal bilaterally   Reflexes: Left: Not performed Right: Not performed   Sensation: normal    Labs:  Last A1c: 5.4     Imaging:   Previous images reviewed.  X-rays ordered and performed today: no    Assessment:        Encounter Diagnoses   Code Name Primary?    M75.102 Rotator cuff syndrome of left shoulder Yes    M19.012 Glenohumeral arthritis, left         Plan:   Dx:  Glenohumeral joint arthritis, left, new problem; rotator cuff syndrome, left, moderate exacerbation  Treatment Plan:Discussed with patient diagnosis and treatment recommendations. Handout given. Recommend conservative treatment to include: avoidance of aggravating activity, significant modification of daily activities, hot/cold therapies, topical and oral medications, braces, HEP/PT/OT, and injections.   Patient with symptoms of glenohumeral arthritis along with rotator cuff syndrome.  She previous had a subacromial subdeltoid injection which did not relieve her symptoms.  We will give patient a glenohumeral joint injection today to see if it will  help her with her symptoms.  Counseled patient to continue doing her home exercises.  She can continue using oral and topical medications for pain relief.  We will see patient back in 1 month.  If patient does not have significant improvement in her symptoms, we would consider ordering a MRI noncontrast of her left shoulder.  Imaging: prior radiological studies independently reviewed; discussed with patient; agree with radiologist interpretation.   Procedure: Discussed CSI as treatment options; discussed injections as treatment options; since conservative measures did not improve symptoms patient consented for CSI today.  Therapy: No formal therapy  Medication: START Lidocaine patches 5%  CONTINUE over-the-counter acetaminophen (Tylenol 1000 mg three times per day as needed)  CONTINUE Voltaren Gel 1% as prescribed  CONTINUE over-the-counter NSAIDs (ibuprofen 200mg three tablets three times a day as needed). Please see your primary care physician for further refills.  RTC: 4 Weeks.           Large Joint Aspiration/Injection: L glenohumeral    Date/Time: 6/4/2025 8:30 AM    Performed by: Cosme Perez,   Authorized by: Cosme Perez, DO    Consent Done?:  Yes (Written)  Indications:  Arthritis, pain and diagnostic evaluation  Site marked: the procedure site was marked      Local anesthesia used?: Yes    Local anesthetic:  Topical anesthetic    Details:  Needle Size:  22 G  Ultrasonic Guidance for needle placement?: Yes    Images are saved and documented.  Approach:  Posterior  Location:  Shoulder  Site:  L glenohumeral  Patient tolerance:  Patient tolerated the procedure well with no immediate complications    Staff Attending: Matti Simon MD    Risks:  Possible complications with the injection include bleeding, infection (.01%), tendon rupture, steroid flare, fat pad or soft tissue atrophy, skin depigmentation, allergic reaction to medications and vasovagal response. (steroid flare treatment is rest, ice, NSAIDs and  resolves in 24-36 hours.)    Consent:  No absolute contraindications (cellulitis overlying joint, infection, lack of informed consent, allergy to injection medication, AVN protein or egg allergy for sodium hyaluronate, or history of steroid flare) or relative contraindications (uncontrolled DM2 A1c>10, coagulopathy, INR > 3.5, previous joint replacement or history of AVN).        Description:  The patient was prepped in normal sterile fashion use of chlorhexidine scrub and the appropriate and anatomic landmarks were identified with ultrasound as image guidance. The patient was evaluated with a [employer] ultrasound machine using a 10 MHz linear probe, following a standard protocol. Ultrasound imaging confirmed placement of the needle in the correct position, with reference to surrounding anatomic structures. Dynamic visualization of the needle was continuous throughout the procedure(s) and maintained good position. Care was taken to ensure there was unrestricted flow of syringe contents (listed below) into the site of injection. The ultrasound image for needle placement was captured and saved in the patient's medical record.  .       Indication for use of Ultrasound Guidance: The indication for the use of ultrasound was to ensure accurate localization of needle for aspiration and/or injection, and minimize risk of damage to surrounding structures. Gilma EL, Venancio S, Naman LJ, David SHORE. Improving injection accuracy of the elbow, knee, and shoulder: does injection site and imaging make a difference? A systematic review. Am J Sports Med. 2011 Mar;39(3):656-62. doi: 10.1177/7567262622773117. Epub 2011 Jan 21. PMID: 01964686.    Body mass index is 30.52 kg/m².    Contents of syringe included: 5mL of 1% of lidocaine with 40mg of Kenalog (1mL of 40mg/mL)    Post Procedure: Patient alert, and moving all extremities. ROM improved, pain decreased.  Good peripheral pulses, no signs of vascular compromise and range of motion  intact.  Aftercare instructions were given to patient at time of discharge.  Relative rest for 3 days-avoiding excess activity.  Place ice on the area for 15 minutes every 4-6 hours. Patient may take Tylenol a 1000 mg b.i.d. or ibuprofen 600 mg t.i.d. for the next 3-4 days if not on medication already and safe to take pending co-morbidities.  Protect the area for the next 1-8 hours if anesthetic was used.  Avoid excessive activity for the next 3-4 weeks.  ER precautions given for fever, severe joint pain or allergic reaction or other new symptoms related to the joint injection.         Cosme Perez D.O.  Sports Medicine Fellow

## 2025-06-04 ENCOUNTER — OFFICE VISIT (OUTPATIENT)
Dept: ORTHOPEDICS | Facility: CLINIC | Age: 65
End: 2025-06-04
Payer: MEDICAID

## 2025-06-04 VITALS
HEIGHT: 65 IN | BODY MASS INDEX: 30.55 KG/M2 | DIASTOLIC BLOOD PRESSURE: 78 MMHG | TEMPERATURE: 98 F | HEART RATE: 59 BPM | WEIGHT: 183.38 LBS | SYSTOLIC BLOOD PRESSURE: 127 MMHG

## 2025-06-04 DIAGNOSIS — M19.012 GLENOHUMERAL ARTHRITIS, LEFT: ICD-10-CM

## 2025-06-04 DIAGNOSIS — M75.102 ROTATOR CUFF SYNDROME OF LEFT SHOULDER: Primary | ICD-10-CM

## 2025-06-04 PROCEDURE — 99213 OFFICE O/P EST LOW 20 MIN: CPT | Mod: PBBFAC | Performed by: STUDENT IN AN ORGANIZED HEALTH CARE EDUCATION/TRAINING PROGRAM

## 2025-06-04 PROCEDURE — 20611 DRAIN/INJ JOINT/BURSA W/US: CPT | Mod: PBBFAC | Performed by: STUDENT IN AN ORGANIZED HEALTH CARE EDUCATION/TRAINING PROGRAM

## 2025-06-04 RX ORDER — ROSUVASTATIN CALCIUM 5 MG/1
5 TABLET, COATED ORAL
COMMUNITY
Start: 2025-04-23

## 2025-06-04 RX ORDER — TRIAMCINOLONE ACETONIDE 40 MG/ML
40 INJECTION, SUSPENSION INTRA-ARTICULAR; INTRAMUSCULAR ONCE
Status: COMPLETED | OUTPATIENT
Start: 2025-06-04 | End: 2025-06-04

## 2025-06-04 RX ORDER — LIDOCAINE HYDROCHLORIDE 10 MG/ML
5 INJECTION, SOLUTION EPIDURAL; INFILTRATION; INTRACAUDAL; PERINEURAL
Status: COMPLETED | OUTPATIENT
Start: 2025-06-04 | End: 2025-06-04

## 2025-06-04 RX ADMIN — LIDOCAINE HYDROCHLORIDE 50 MG: 10 INJECTION, SOLUTION EPIDURAL; INFILTRATION; INTRACAUDAL; PERINEURAL at 10:06

## 2025-06-04 RX ADMIN — TRIAMCINOLONE ACETONIDE 40 MG: 40 INJECTION, SUSPENSION INTRA-ARTICULAR; INTRAMUSCULAR at 10:06

## 2025-06-08 RX ORDER — LIDOCAINE 50 MG/G
1 PATCH TOPICAL DAILY
Qty: 30 PATCH | Refills: 0 | Status: SHIPPED | OUTPATIENT
Start: 2025-06-08

## 2025-06-09 NOTE — PROGRESS NOTES
Faculty Attestation: Leonora Barnes  was seen in Sports Medicine Clinic Patient seen and evaluated at the time of the visit. History of Present Illness, Physical Exam, and Assessment and Plan reviewed.     Treatment plan is reasonable and appropriate. Compliance with treatment recommendations is important.      No imaging studies were done today.     Procedure note reviewed. Present for entire procedure with the fellow. Patient tolerated procedure well.     Matti Simon MD  Sports Medicine

## 2025-06-25 ENCOUNTER — PATIENT MESSAGE (OUTPATIENT)
Dept: ADMINISTRATIVE | Facility: OTHER | Age: 65
End: 2025-06-25
Payer: MEDICAID

## 2025-06-26 ENCOUNTER — OFFICE VISIT (OUTPATIENT)
Dept: FAMILY MEDICINE | Facility: CLINIC | Age: 65
End: 2025-06-26
Payer: MEDICAID

## 2025-06-26 VITALS
SYSTOLIC BLOOD PRESSURE: 117 MMHG | BODY MASS INDEX: 30.32 KG/M2 | RESPIRATION RATE: 20 BRPM | OXYGEN SATURATION: 100 % | HEIGHT: 65 IN | TEMPERATURE: 98 F | HEART RATE: 89 BPM | DIASTOLIC BLOOD PRESSURE: 77 MMHG | WEIGHT: 182 LBS

## 2025-06-26 DIAGNOSIS — G25.81 RESTLESS LEG SYNDROME: ICD-10-CM

## 2025-06-26 DIAGNOSIS — M25.50 POLYARTHRALGIA: ICD-10-CM

## 2025-06-26 DIAGNOSIS — I10 HYPERTENSION, UNSPECIFIED TYPE: Primary | ICD-10-CM

## 2025-06-26 DIAGNOSIS — I47.10 SVT (SUPRAVENTRICULAR TACHYCARDIA): ICD-10-CM

## 2025-06-26 DIAGNOSIS — R51.9 NONINTRACTABLE EPISODIC HEADACHE, UNSPECIFIED HEADACHE TYPE: ICD-10-CM

## 2025-06-26 DIAGNOSIS — Z00.00 HEALTHCARE MAINTENANCE: ICD-10-CM

## 2025-06-26 DIAGNOSIS — G43.809 OTHER MIGRAINE WITHOUT STATUS MIGRAINOSUS, NOT INTRACTABLE: ICD-10-CM

## 2025-06-26 PROBLEM — M19.90 ARTHRITIS: Status: ACTIVE | Noted: 2025-06-26

## 2025-06-26 PROBLEM — M51.9 DISORDER OF INTERVERTEBRAL DISC OF LUMBAR SPINE: Status: ACTIVE | Noted: 2025-06-26

## 2025-06-26 PROBLEM — Z78.0 POSTMENOPAUSAL: Status: ACTIVE | Noted: 2025-06-26

## 2025-06-26 PROBLEM — M79.7 FIBROMYALGIA: Status: ACTIVE | Noted: 2025-06-26

## 2025-06-26 PROBLEM — G93.32 CHRONIC FATIGUE SYNDROME: Status: ACTIVE | Noted: 2025-06-26

## 2025-06-26 PROBLEM — G43.709 NON-REFRACTORY CHRONIC MIGRAINE WITHOUT AURA: Status: ACTIVE | Noted: 2025-06-26

## 2025-06-26 LAB
ALBUMIN SERPL-MCNC: 3.9 G/DL (ref 3.4–4.8)
ALBUMIN/GLOB SERPL: 0.9 RATIO (ref 1.1–2)
ALP SERPL-CCNC: 93 UNIT/L (ref 40–150)
ALT SERPL-CCNC: 26 UNIT/L (ref 0–55)
ANION GAP SERPL CALC-SCNC: 11 MEQ/L
AST SERPL-CCNC: 26 UNIT/L (ref 11–45)
BASOPHILS # BLD AUTO: 0.04 X10(3)/MCL
BASOPHILS NFR BLD AUTO: 0.5 %
BILIRUB SERPL-MCNC: 0.5 MG/DL
BUN SERPL-MCNC: 20 MG/DL (ref 9.8–20.1)
CALCIUM SERPL-MCNC: 9.5 MG/DL (ref 8.4–10.2)
CHLORIDE SERPL-SCNC: 107 MMOL/L (ref 98–107)
CO2 SERPL-SCNC: 23 MMOL/L (ref 23–31)
CREAT SERPL-MCNC: 0.95 MG/DL (ref 0.55–1.02)
CREAT/UREA NIT SERPL: 21
EOSINOPHIL # BLD AUTO: 0.16 X10(3)/MCL (ref 0–0.9)
EOSINOPHIL NFR BLD AUTO: 2 %
ERYTHROCYTE [DISTWIDTH] IN BLOOD BY AUTOMATED COUNT: 14.9 % (ref 11.5–17)
GFR SERPLBLD CREATININE-BSD FMLA CKD-EPI: >60 ML/MIN/1.73/M2
GLOBULIN SER-MCNC: 4.3 GM/DL (ref 2.4–3.5)
GLUCOSE SERPL-MCNC: 73 MG/DL (ref 82–115)
HCT VFR BLD AUTO: 42.9 % (ref 37–47)
HCV AB SERPL QL IA: NONREACTIVE
HGB BLD-MCNC: 14.3 G/DL (ref 12–16)
IMM GRANULOCYTES # BLD AUTO: 0.02 X10(3)/MCL (ref 0–0.04)
IMM GRANULOCYTES NFR BLD AUTO: 0.3 %
LYMPHOCYTES # BLD AUTO: 2.19 X10(3)/MCL (ref 0.6–4.6)
LYMPHOCYTES NFR BLD AUTO: 27.7 %
MCH RBC QN AUTO: 29.9 PG (ref 27–31)
MCHC RBC AUTO-ENTMCNC: 33.3 G/DL (ref 33–36)
MCV RBC AUTO: 89.7 FL (ref 80–94)
MONOCYTES # BLD AUTO: 0.59 X10(3)/MCL (ref 0.1–1.3)
MONOCYTES NFR BLD AUTO: 7.4 %
NEUTROPHILS # BLD AUTO: 4.92 X10(3)/MCL (ref 2.1–9.2)
NEUTROPHILS NFR BLD AUTO: 62.1 %
NRBC BLD AUTO-RTO: 0 %
PLATELET # BLD AUTO: 141 X10(3)/MCL (ref 130–400)
PMV BLD AUTO: 11.2 FL (ref 7.4–10.4)
POTASSIUM SERPL-SCNC: 4.2 MMOL/L (ref 3.5–5.1)
PROT SERPL-MCNC: 8.2 GM/DL (ref 5.8–7.6)
RBC # BLD AUTO: 4.78 X10(6)/MCL (ref 4.2–5.4)
SODIUM SERPL-SCNC: 141 MMOL/L (ref 136–145)
WBC # BLD AUTO: 7.92 X10(3)/MCL (ref 4.5–11.5)

## 2025-06-26 PROCEDURE — 99214 OFFICE O/P EST MOD 30 MIN: CPT | Mod: PBBFAC

## 2025-06-26 PROCEDURE — 80053 COMPREHEN METABOLIC PANEL: CPT

## 2025-06-26 PROCEDURE — 85025 COMPLETE CBC W/AUTO DIFF WBC: CPT

## 2025-06-26 PROCEDURE — 86803 HEPATITIS C AB TEST: CPT

## 2025-06-26 RX ORDER — SUMATRIPTAN SUCCINATE 100 MG/1
TABLET ORAL
Qty: 60 TABLET | Refills: 2 | Status: SHIPPED | OUTPATIENT
Start: 2025-06-26 | End: 2025-06-26

## 2025-06-26 RX ORDER — SUMATRIPTAN SUCCINATE 100 MG/1
TABLET ORAL
Qty: 60 TABLET | Refills: 2 | Status: SHIPPED | OUTPATIENT
Start: 2025-06-26 | End: 2026-06-24

## 2025-06-26 RX ORDER — TOPIRAMATE 25 MG/1
25 TABLET, FILM COATED ORAL NIGHTLY
Qty: 7 TABLET | Refills: 0 | Status: SHIPPED | OUTPATIENT
Start: 2025-06-26 | End: 2025-07-03

## 2025-06-26 RX ORDER — ATENOLOL 25 MG/1
25 TABLET ORAL DAILY
Qty: 30 TABLET | Refills: 2 | Status: SHIPPED | OUTPATIENT
Start: 2025-06-26

## 2025-06-26 RX ORDER — TOPIRAMATE 50 MG/1
TABLET, FILM COATED ORAL
Qty: 49 TABLET | Refills: 0 | Status: SHIPPED | OUTPATIENT
Start: 2025-07-03 | End: 2025-07-31

## 2025-06-26 RX ORDER — GABAPENTIN 300 MG/1
300 CAPSULE ORAL 2 TIMES DAILY
Qty: 60 CAPSULE | Refills: 2 | Status: SHIPPED | OUTPATIENT
Start: 2025-06-26

## 2025-06-26 NOTE — PROGRESS NOTES
[facility] Progress Note    ID:  Leonora Barnes   MRN:  [id number]     6/26/2025    Chief Complaint:    Chief Complaint   Patient presents with    Establish Care     New Patient. C/o of constant headaches states weekly.      History of Present Illness:  Leonora Barnes is a 65 y.o. female who presents to [facility] to establish care.    Acute concern:    Left sided head pain  - new onset left-sided head pain starting at the beginning of this year  - Patient has suffered with chronic migraines for past 30 years or so and reports this pain is different  - has not been evaluated for this since onset  - reported as throbbing pain to the left temporal area  - denied any change in vision, weakness, any neurological symptoms    Chronic Conditions:    Restless leg syndrome  - gabapentin 300mg BID  - Robaxin daily qhs with diclofenac prn    Left shoulder pain  - Follows with sports medicine clinic for injections  - Last injection 6/4/25    Migraines  - Imitrex for migraines  - Has attempted fioricet in the past with no improvement  - Never attempted maintenance therapy  - reports increase in frequency of migraines last couple of months    SVT  - Atenolol, follows with cardiology    HTN  - 117/77, at goal    PMHx: Arthritis, chronic left shoulder pain, SVT, Migraines  Hospitalizations: hospitalization in the past for Migraines and chest pain, no recent hospitalization  PSHx: Cardiac surgery for ASD  Allergies: NKDA  FHx: Asthma in mother  Social Hx: Denied any alcohol, tobacco, illicit drug use    Current Outpatient Medications   Medication Instructions    atenoloL (TENORMIN) 25 mg, Oral, Daily    diclofenac sodium (VOLTAREN ARTHRITIS PAIN) 2 g, Topical (Top), 4 times daily, Do not exceed 32 grams/day: do not to exceed 8 grams/day/single joint of upper extremities; do not to exceed 16 grams/day/single joint of lower extremities.  Please request refill of this medication from your PCP.    gabapentin  (NEURONTIN) 300 mg, Oral, 2 times daily    LIDOcaine (LIDODERM) 5 % 1 patch, Transdermal, Daily, Remove & Discard patch within 12 hours or as directed by MD    methocarbamoL (ROBAXIN) 500 MG Tab TAKE 1 TABLET(500 MG) BY MOUTH TWICE DAILY AS NEEDED FOR PAIN OR SPASM    nitroGLYCERIN (NITROSTAT) 0.4 mg, Sublingual, Every 5 min PRN    pantoprazole (PROTONIX) 20 mg, Oral, Daily    rosuvastatin (CRESTOR) 5 mg    sumatriptan (IMITREX) 100 MG tablet Take 1 tablet by mouth as needed for migraine. Can repeat dose in 2 hours if necessary. Max dose 2 tablets daily.    [START ON 7/3/2025] topiramate (TOPAMAX) 50 MG tablet Take 1 tablet (50 mg total) by mouth every evening for 7 days, THEN 1 tablet (50 mg total) 2 (two) times daily for 21 days.    topiramate (TOPAMAX) 25 mg, Oral, Nightly       Review of Systems:  Pertinent positives and negatives as mentioned in HPI    Objective:    Vitals:    06/26/25 1248   BP: 117/77   Pulse: 89   Resp: 20   Temp: 97.8 °F (36.6 °C)       Physical Exam  Constitutional:       General: She is not in acute distress.     Appearance: Normal appearance. She is not ill-appearing.   HENT:      Head: Normocephalic and atraumatic.   Eyes:      General: No scleral icterus.  Cardiovascular:      Rate and Rhythm: Normal rate and regular rhythm.      Pulses: Normal pulses.      Heart sounds: Normal heart sounds.   Pulmonary:      Effort: Pulmonary effort is normal.      Breath sounds: Normal breath sounds. No stridor. No wheezing.   Musculoskeletal:         General: Normal range of motion.      Cervical back: Normal range of motion and neck supple.   Skin:     General: Skin is warm.      Coloration: Skin is not jaundiced.   Neurological:      Mental Status: She is alert.             Assessment/Plan:  Leonora was seen today for establish care.    Diagnoses and all orders for this visit:    Hypertension, unspecified type  -     CBC Auto Differential; Future  -     Comprehensive Metabolic Panel; Future  -      Comprehensive Metabolic Panel  -     CBC Auto Differential    Polyarthralgia  -     gabapentin (NEURONTIN) 300 MG capsule; Take 1 capsule (300 mg total) by mouth 2 (two) times daily.    Restless leg syndrome  -     gabapentin (NEURONTIN) 300 MG capsule; Take 1 capsule (300 mg total) by mouth 2 (two) times daily.    SVT (supraventricular tachycardia)  -     atenoloL (TENORMIN) 25 MG tablet; Take 1 tablet (25 mg total) by mouth once daily.    Other migraine without status migrainosus, not intractable  -     Discontinue: sumatriptan (IMITREX) 100 MG tablet; Take 1 tablet by mouth as needed for migraine. Can repeat dose in 2 hours if necessary. Max dose 2 tablets daily.  -     sumatriptan (IMITREX) 100 MG tablet; Take 1 tablet by mouth as needed for migraine. Can repeat dose in 2 hours if necessary. Max dose 2 tablets daily.  -     CT Head Without Contrast; Future    Healthcare maintenance  -     CBC Auto Differential; Future  -     Comprehensive Metabolic Panel; Future  -     Hepatitis C Antibody; Future  -     pneumoc 20-michael conj-dip cr(PF) (PREVNAR-20 (PF)) injection Syrg 0.5 mL  -     Hepatitis C Antibody  -     Comprehensive Metabolic Panel  -     CBC Auto Differential    Nonintractable episodic headache, unspecified headache type    Other orders  -     topiramate (TOPAMAX) 25 MG tablet; Take 1 tablet (25 mg total) by mouth every evening. for 7 days  -     topiramate (TOPAMAX) 50 MG tablet; Take 1 tablet (50 mg total) by mouth every evening for 7 days, THEN 1 tablet (50 mg total) 2 (two) times daily for 21 days.        Health Maintenance   Topic Date Due    Hepatitis C Screening  Never done    RSV Vaccine (Age 60+ and Pregnant patients) (1 - Risk 60-74 years 1-dose series) Never done    COVID-19 Vaccine (1 - 2024-25 season) Never done    Mammogram  05/10/2025    HIV Screening  05/08/2029 (Originally 2/6/1975)    Hemoglobin A1c (Diabetic Prevention Screening)  02/22/2026    Colorectal Cancer Screening  12/11/2027     Lipid Panel  02/22/2028    Cervical Cancer Screening  05/06/2030    TETANUS VACCINE  08/09/2033    Shingles Vaccine  Completed    Influenza Vaccine  Completed    Pneumococcal Vaccines (Age 50+)  Completed     66 yo F presenting to establish care    - medication refills as above  - chronic medical conditions stable as per HPI  - Hepatitis C screening along with yearly lab work  - Prevnar vaccine offered, patient amenable to get in the vaccine, we will administer  - Mammogram due and ordered by GYN, scheduled for 8/6/25  - given new onset headache pain since the beginning of this year which is different from typical migraine pain, we will obtain CT head to further evaluate  - patient interested in starting maintenance medication for migraine headaches, we will initiate Topamax at 25 mg nightly x7 days, then we will increase to 50 mg nightly x7 days, and finally we will be on 50 mg b.i.d. for maintenance therapy  - RTC in 5 weeks to assess Topamax effectiveness and routine follow up    Follow up in about 5 weeks (around 7/31/2025) for routien f/u.    Future Appointments   Date Time Provider Department Center   7/7/2025  9:30 AM Cosme Perez DO [facility] ORTHO [place] [facility]   7/29/2025  2:20 PM Roshan Carrillo MD [facility] FM RES [facility]   8/6/2025  7:30 AM [facility] MAMMO1 [facility] MAMMO [facility]   5/12/2026  7:50 AM Kalie Lockwood, FNP [facility] GYN [place] [facility]     Roshan Carrillo MD  [facility]– [place], [place]

## 2025-06-27 DIAGNOSIS — G43.809 OTHER MIGRAINE WITHOUT STATUS MIGRAINOSUS, NOT INTRACTABLE: ICD-10-CM

## 2025-06-29 RX ORDER — SUMATRIPTAN SUCCINATE 100 MG/1
TABLET ORAL
Qty: 60 TABLET | Refills: 2 | Status: SHIPPED | OUTPATIENT
Start: 2025-06-29 | End: 2026-06-25

## 2025-07-07 ENCOUNTER — OFFICE VISIT (OUTPATIENT)
Dept: ORTHOPEDICS | Facility: CLINIC | Age: 65
End: 2025-07-07
Payer: MEDICAID

## 2025-07-07 VITALS
DIASTOLIC BLOOD PRESSURE: 71 MMHG | SYSTOLIC BLOOD PRESSURE: 104 MMHG | WEIGHT: 182.13 LBS | BODY MASS INDEX: 30.34 KG/M2 | HEART RATE: 55 BPM | HEIGHT: 65 IN

## 2025-07-07 DIAGNOSIS — G89.29 CHRONIC LEFT SHOULDER PAIN: ICD-10-CM

## 2025-07-07 DIAGNOSIS — M75.102 ROTATOR CUFF SYNDROME OF LEFT SHOULDER: Primary | ICD-10-CM

## 2025-07-07 DIAGNOSIS — M25.512 CHRONIC LEFT SHOULDER PAIN: ICD-10-CM

## 2025-07-07 PROCEDURE — 99213 OFFICE O/P EST LOW 20 MIN: CPT | Mod: PBBFAC | Performed by: STUDENT IN AN ORGANIZED HEALTH CARE EDUCATION/TRAINING PROGRAM

## 2025-07-07 RX ORDER — MELOXICAM 15 MG/1
15 TABLET ORAL DAILY
Qty: 30 TABLET | Refills: 0 | Status: SHIPPED | OUTPATIENT
Start: 2025-07-07 | End: 2025-08-06

## 2025-07-07 NOTE — PROGRESS NOTES
"Subjective:    Patient ID: Leonora Barnes is a right handed 65 y.o. female  who presented to [facility] for follow up..      Chief Complaint: Pain of the Left Shoulder      History of Present Illness:  Leonora Barnes is a is a 65-year-old female who presents to the clinic today for evaluation of left shoulder pain that has been going on and off for last 6 months.  She reports that her pain is located at her proximal humerus worse with lifting her arm above her head.  She rates her pain today as a 8/10 not improving with the any oral or topical medications or lidocaine patches.  She has done formal physical therapy has had a glenohumeral injection along with a subacromial subdeltoid injection without any improvement in her symptoms previously.  She reports that pain did improve after the last glenohumeral joint injection but only lasted her about 2 weeks.  She denied any radiation of symptoms down her arm.  She also denies any trauma to the shoulder.    Shoulder Review of Systems:  Swelling?  no  Instability?  no  Clicking?  no  Limited ROM? yes  Fever/Chills? no  Subluxation? no  Dislocation? no    Comorbid Conditions  GERD       Objective:      Physical Exam:    /71   Pulse (!) 55   Ht 5' 5" (1.651 m)   Wt 82.6 kg (182 lb 1.6 oz)   BMI 30.30 kg/m²     Ortho/SPM Exam    Appearance:  Soft tissue swelling: Left: no Right: no  Effusion: Left:  Negative Right: Negative  Erythema: Left no Right: no  Ecchymosis: Left: no Right: no  Atrophy: Left: no Right: no  Scapular winging: Left: no Right: no    Palpation:  Shoulder Tenderness: Left: proximal humerus  Right: none    Range of motion:  Flexion (0-90): Left:  90 Right: 180  Abduction (0-180): Left:  90 Right: 180  External rotation (0-55): Left: 55 Right: 55  Internal rotation (0-45): Left: 45 Right: 45    Strength:  Abduction: Left: 5/5 Pain: yes Right: 5/5 Pain: no  External rotation: " Left: 5/5 Pain: no Right: 5/5 Pain: no  Internal rotation: Left: 5/5 Pain: no Right: 5/5 Pain: no  Elbow flexion: Left: 5/5 Pain: no Right: 5/5 Pain: no  Elbow extension: Left: 5/5 Pain: no Right: 5/5 Pain: no    Special Tests:  Subacromial Impingement  Neer: Left: Positive Right: Negative  Triana: Left: Positive Right: Negative    AC Joint Arthritis:  Cross-body abduction: Left: Negative Right: Negative    Rotator Cuff Tear   Drop arm test: Left: Negative Right: Negative  Hornblower: Left: Left: Not performed Right: Not performed   Belly press test: Left: Negative Right: Negative  Miladis test (Empty can): Left: Positive Right: Negative    Biceps tendon/Labral tear  Speed's: Left: Negative Right: Negative  Yergason's: Left: Negative Right: Negative  O'Juan Carlos's: Left: Negative Right: Negative  Cranck test: Left: Negative Right: Negative    Stability   Sulcus sign: Left: Not performed Right: Not performed   Apprehension test: Left: Not performed Right: Not performed   Relocation test: Left: Not performed Right: Not performed     Cervical   Spurling: Left: Negative Right: Negative    AIN/PIN/Ulnar nerve: Intact and symmetric    General appearance: NAD  Peripheral pulses: normal bilaterally   Reflexes: Left: Not performed Right: Not performed   Sensation: normal    Labs:  Last A1c: 5.4     Imaging:   Previous images reviewed.  X-rays ordered and performed today: no     Assessment:        Encounter Diagnoses   Code Name Primary?    M75.102 Rotator cuff syndrome of left shoulder Yes    M25.512, G89.29 Chronic left shoulder pain         Plan:   Dx:  Left rotator cuff tear, moderate exacerbation  Treatment Plan:Discussed with patient diagnosis and treatment recommendations. Handout given. Recommend conservative treatment to include: avoidance of aggravating activity, significant modification of daily activities, hot/cold therapies, topical and oral medications, braces, HEP/PT/OT, and injections.   Patient with signs and  symptoms of left rotator cuff syndrome.  She has done injections of her subacromial/subdeltoid bursa along with a glenohumeral joint injection without any significant improvement of symptoms.  She has been going to formal physical therapy and has tried oral and topical medications without any significant improvement in her symptoms.  We will send patient for a noncontrast MRI of her left shoulder for further evaluation.  We will she can continue using oral topical medications for pain relief.  We will see patient back after MRI of the left shoulder has been done.  Imaging: prior radiological studies independently reviewed; discussed with patient; agree with radiologist interpretation.   Procedure: Discussed CSI as treatment options; discussed injections as treatment options in future if conservative measures do not improve symptoms.  Therapy: No formal therapy  Medication: START meloxicam (Mobic 15 mg daily)  CONTINUE over-the-counter acetaminophen (Tylenol 1000 mg three times per day as needed)  CONTINUE Voltaren Gel 1% as prescribed. Please see your primary care physician for further refills.  RTC: after imaging test complete.           Cosme Perez D.O.  Sports Medicine Fellow\

## 2025-07-08 DIAGNOSIS — I47.10 SVT (SUPRAVENTRICULAR TACHYCARDIA): ICD-10-CM

## 2025-07-08 RX ORDER — ATENOLOL 25 MG/1
25 TABLET ORAL DAILY
Qty: 30 TABLET | Refills: 2 | Status: CANCELLED | OUTPATIENT
Start: 2025-07-08

## 2025-07-08 NOTE — PROGRESS NOTES
Faculty Attestation: Leonora Barnes  was seen in Sports Medicine Clinic Discussed with Dr. Perez at the time of the visit. History of Present Illness, Physical Exam, and Assessment and Plan reviewed.     Treatment plan is reasonable and appropriate. Compliance with treatment recommendations is important.      No imaging studies were done today.     No procedure was performed.    Matti Simon MD  Sports Medicine

## 2025-07-10 ENCOUNTER — HOSPITAL ENCOUNTER (OUTPATIENT)
Dept: RADIOLOGY | Facility: HOSPITAL | Age: 65
Discharge: HOME | End: 2025-07-10
Payer: MEDICAID

## 2025-07-10 DIAGNOSIS — G43.809 OTHER MIGRAINE WITHOUT STATUS MIGRAINOSUS, NOT INTRACTABLE: ICD-10-CM

## 2025-07-10 PROCEDURE — 70450 CT HEAD/BRAIN W/O DYE: CPT | Mod: TC

## 2025-07-17 ENCOUNTER — OFFICE VISIT (OUTPATIENT)
Dept: FAMILY MEDICINE | Facility: CLINIC | Age: 65
End: 2025-07-17
Payer: MEDICAID

## 2025-07-17 VITALS
DIASTOLIC BLOOD PRESSURE: 63 MMHG | HEART RATE: 63 BPM | TEMPERATURE: 97 F | WEIGHT: 183 LBS | BODY MASS INDEX: 30.49 KG/M2 | RESPIRATION RATE: 16 BRPM | SYSTOLIC BLOOD PRESSURE: 98 MMHG | HEIGHT: 65 IN | OXYGEN SATURATION: 100 %

## 2025-07-17 DIAGNOSIS — M25.50 POLYARTHRALGIA: ICD-10-CM

## 2025-07-17 DIAGNOSIS — I47.10 SVT (SUPRAVENTRICULAR TACHYCARDIA): ICD-10-CM

## 2025-07-17 DIAGNOSIS — G43.809 OTHER MIGRAINE WITHOUT STATUS MIGRAINOSUS, NOT INTRACTABLE: ICD-10-CM

## 2025-07-17 DIAGNOSIS — I10 HYPERTENSION, UNSPECIFIED TYPE: Primary | ICD-10-CM

## 2025-07-17 DIAGNOSIS — G25.81 RESTLESS LEG SYNDROME: ICD-10-CM

## 2025-07-17 DIAGNOSIS — K21.9 GASTROESOPHAGEAL REFLUX DISEASE, UNSPECIFIED WHETHER ESOPHAGITIS PRESENT: ICD-10-CM

## 2025-07-17 PROCEDURE — 99214 OFFICE O/P EST MOD 30 MIN: CPT | Mod: PBBFAC

## 2025-07-17 RX ORDER — PANTOPRAZOLE SODIUM 20 MG/1
20 TABLET, DELAYED RELEASE ORAL DAILY
Qty: 30 TABLET | Refills: 11 | Status: SHIPPED | OUTPATIENT
Start: 2025-07-17 | End: 2026-07-17

## 2025-07-17 NOTE — PROGRESS NOTES
[facility] Progress Note    ID:  Leonora Barnes   MRN:  [id number]     7/17/2025    Chief Complaint:    Chief Complaint   Patient presents with    Hypertension     History of Present Illness:  Leonora Barnes is a 65 y.o. female who presents to [facility] FM clinic for:     Interval History:   Patient's last seen 06/26/2025 to establish care.  Since that time, patient followed up with orthopedics for shoulder pain on 07/07/2025 and diagnosed with left rotator cuff tear.  MRI was ordered for further evaluation but has not been performed yet.  Patient reports feeling well today with no acute complaints.  Denies any fevers, chills, nausea, vomiting, chest pain, shortness of breath.    Chronic Conditions:     Restless leg syndrome  - gabapentin 300mg BID  - Robaxin daily qhs with diclofenac prn     Left shoulder pain  - Follows with sports medicine clinic for injections  - Last injection 6/4/25  - Seen 7/7/25 and diagnosed with possible rotator cuff tear, pending MRI for further evaluation     Migraines  - Imitrex for migraines  - Has attempted fioricet in the past with no improvement  - Never attempted maintenance therapy in the past, initiated on Topamax for maintenance therapy at last visit, tolerating well and reports noticed improvement   - Currently on Topamax 50mg BID  - CTH 7/10/25 2/2 worsening migraines negative     SVT  - Atenolol, follows with cardiology     HTN  - 98/63, at goal  - denies feeling lightheaded or dizzy     PMHx: Arthritis, chronic left shoulder pain, SVT, Migraines  Hospitalizations: hospitalization in the past for Migraines and chest pain, no recent hospitalization  PSHx: Cardiac surgery for ASD  Allergies: NKDA  FHx: Asthma in mother  Social Hx: Denied any alcohol, tobacco, illicit drug use    Current Outpatient Medications   Medication Instructions    atenoloL (TENORMIN) 25 mg, Oral, Daily    diclofenac sodium (VOLTAREN ARTHRITIS PAIN) 2 g, Topical (Top), 4 times daily, Do  not exceed 32 grams/day: do not to exceed 8 grams/day/single joint of upper extremities; do not to exceed 16 grams/day/single joint of lower extremities.  Please request refill of this medication from your PCP.    gabapentin (NEURONTIN) 300 mg, Oral, 2 times daily    LIDOcaine (LIDODERM) 5 % 1 patch, Transdermal, Daily, Remove & Discard patch within 12 hours or as directed by MD    meloxicam (MOBIC) 15 mg, Oral, Daily, Please request refill of this medication from your PCP.    methocarbamoL (ROBAXIN) 500 MG Tab TAKE 1 TABLET(500 MG) BY MOUTH TWICE DAILY AS NEEDED FOR PAIN OR SPASM    nitroGLYCERIN (NITROSTAT) 0.4 mg, Sublingual, Every 5 min PRN    pantoprazole (PROTONIX) 20 mg, Oral, Daily    rosuvastatin (CRESTOR) 5 mg    sumatriptan (IMITREX) 100 MG tablet Take 1 tablet by mouth as needed for migraine. Can repeat dose in 2 hours if necessary. Max dose 2 tablets daily.    topiramate (TOPAMAX) 50 MG tablet Take 1 tablet (50 mg total) by mouth every evening for 7 days, THEN 1 tablet (50 mg total) 2 (two) times daily for 21 days.       Review of Systems:  Pertinent positives and negatives as mentioned in HPI    Objective:    Vitals:    07/17/25 1434   BP: 98/63   Pulse: 63   Resp: 16   Temp: 97.4 °F (36.3 °C)       Physical Exam  Constitutional:       General: She is not in acute distress.     Appearance: Normal appearance. She is not ill-appearing.   HENT:      Head: Normocephalic and atraumatic.   Eyes:      General: No scleral icterus.  Cardiovascular:      Rate and Rhythm: Normal rate and regular rhythm.      Pulses: Normal pulses.      Heart sounds: Normal heart sounds.   Pulmonary:      Effort: Pulmonary effort is normal.      Breath sounds: Normal breath sounds. No stridor. No wheezing.   Musculoskeletal:         General: Normal range of motion.      Cervical back: Normal range of motion and neck supple.   Skin:     General: Skin is warm.      Coloration: Skin is not jaundiced.   Neurological:      Mental  Status: She is alert.             Assessment/Plan:  Leonora was seen today for hypertension.    Diagnoses and all orders for this visit:    Hypertension, unspecified type    Gastroesophageal reflux disease, unspecified whether esophagitis present  -     pantoprazole (PROTONIX) 20 MG tablet; Take 1 tablet (20 mg total) by mouth once daily.    SVT (supraventricular tachycardia)    Other migraine without status migrainosus, not intractable    Restless leg syndrome    Polyarthralgia        Health Maintenance   Topic Date Due    RSV Vaccine (Age 60+ and Pregnant patients) (1 - Risk 60-74 years 1-dose series) Never done    COVID-19 Vaccine (1 - 2024-25 season) Never done    Mammogram  05/10/2025    HIV Screening  05/08/2029 (Originally 2/6/1975)    Influenza Vaccine (1) 09/01/2025    Hemoglobin A1c (Diabetic Prevention Screening)  02/22/2026    Colorectal Cancer Screening  12/11/2027    Lipid Panel  02/22/2028    Cervical Cancer Screening  05/06/2030    TETANUS VACCINE  08/09/2033    Hepatitis C Screening  Completed    Shingles Vaccine  Completed    Pneumococcal Vaccines (Age 50+)  Completed     66 yo F presenting for routine f/u    - Mammogram due, scheduled for 8/6/25   -Tolerating maintenance Topamax for migraines with noticed improvement  - discussed previous CT head results with the patient as well as prior lab results  - chronic medical conditions stable with marked improvement in migraine since starting maintenance dose  - medication refills as above    Follow up in about 3 months (around 10/17/2025) for routine f/u.    Future Appointments   Date Time Provider Department Center   8/6/2025  7:30 AM [id number]    5/12/2026  7:50 AM Klaie Lockwood, B[facility] [place]     Roshan Carrillo MD  [facility]

## 2025-08-06 ENCOUNTER — HOSPITAL ENCOUNTER (OUTPATIENT)
Dept: RADIOLOGY | Facility: HOSPITAL | Age: 65
Discharge: HOME | End: 2025-08-06
Attending: NURSE PRACTITIONER
Payer: MEDICAID

## 2025-08-06 DIAGNOSIS — Z12.31 SCREENING MAMMOGRAM FOR BREAST CANCER: ICD-10-CM

## 2025-08-06 PROCEDURE — 77067 SCR MAMMO BI INCL CAD: CPT | Mod: 26,,, | Performed by: STUDENT IN AN ORGANIZED HEALTH CARE EDUCATION/TRAINING PROGRAM

## 2025-08-06 PROCEDURE — 77067 SCR MAMMO BI INCL CAD: CPT | Mod: TC

## 2025-08-06 PROCEDURE — 77063 BREAST TOMOSYNTHESIS BI: CPT | Mod: 26,,, | Performed by: STUDENT IN AN ORGANIZED HEALTH CARE EDUCATION/TRAINING PROGRAM

## (undated) DEVICE — KIT SURGICAL COLON .25 1.1OZ

## (undated) DEVICE — MANIFOLD 4 PORT

## (undated) DEVICE — TRAP ETRAP POLYP 50 TRAY

## (undated) DEVICE — SNARE EXACTO COLD